# Patient Record
Sex: MALE | Race: WHITE | Employment: FULL TIME | URBAN - METROPOLITAN AREA
[De-identification: names, ages, dates, MRNs, and addresses within clinical notes are randomized per-mention and may not be internally consistent; named-entity substitution may affect disease eponyms.]

---

## 2017-01-12 ENCOUNTER — OFFICE VISIT (OUTPATIENT)
Dept: ORTHOPEDIC SURGERY | Age: 47
End: 2017-01-12

## 2017-01-12 VITALS
HEIGHT: 71 IN | DIASTOLIC BLOOD PRESSURE: 83 MMHG | WEIGHT: 227.96 LBS | SYSTOLIC BLOOD PRESSURE: 128 MMHG | BODY MASS INDEX: 31.91 KG/M2 | HEART RATE: 89 BPM

## 2017-01-12 DIAGNOSIS — M17.11 PRIMARY OSTEOARTHRITIS OF RIGHT KNEE: ICD-10-CM

## 2017-01-12 DIAGNOSIS — M25.561 RIGHT KNEE PAIN, UNSPECIFIED CHRONICITY: Primary | ICD-10-CM

## 2017-01-12 PROCEDURE — 99214 OFFICE O/P EST MOD 30 MIN: CPT | Performed by: ORTHOPAEDIC SURGERY

## 2017-01-12 PROCEDURE — 73562 X-RAY EXAM OF KNEE 3: CPT | Performed by: ORTHOPAEDIC SURGERY

## 2017-01-30 DIAGNOSIS — I10 ESSENTIAL HYPERTENSION: ICD-10-CM

## 2017-01-30 RX ORDER — LISINOPRIL 20 MG/1
20 TABLET ORAL DAILY
Qty: 90 TABLET | Refills: 1 | Status: SHIPPED | OUTPATIENT
Start: 2017-01-30 | End: 2017-04-26 | Stop reason: SDUPTHER

## 2017-02-20 ENCOUNTER — OFFICE VISIT (OUTPATIENT)
Dept: FAMILY MEDICINE CLINIC | Age: 47
End: 2017-02-20

## 2017-02-20 VITALS
HEIGHT: 71 IN | BODY MASS INDEX: 32.98 KG/M2 | SYSTOLIC BLOOD PRESSURE: 122 MMHG | DIASTOLIC BLOOD PRESSURE: 84 MMHG | HEART RATE: 82 BPM | OXYGEN SATURATION: 97 % | TEMPERATURE: 98.2 F | WEIGHT: 235.6 LBS

## 2017-02-20 DIAGNOSIS — E78.2 MIXED HYPERLIPIDEMIA: ICD-10-CM

## 2017-02-20 DIAGNOSIS — Z01.818 PRE-OPERATIVE GENERAL PHYSICAL EXAMINATION: Primary | ICD-10-CM

## 2017-02-20 DIAGNOSIS — R20.2 PARESTHESIA OF RIGHT ARM AND LEG: ICD-10-CM

## 2017-02-20 DIAGNOSIS — J30.1 NON-SEASONAL ALLERGIC RHINITIS DUE TO POLLEN: ICD-10-CM

## 2017-02-20 DIAGNOSIS — R74.8 ELEVATED LIVER ENZYMES: ICD-10-CM

## 2017-02-20 DIAGNOSIS — Z00.00 ROUTINE GENERAL MEDICAL EXAMINATION AT A HEALTH CARE FACILITY: ICD-10-CM

## 2017-02-20 DIAGNOSIS — I63.9 ARTERIAL ISCHEMIC STROKE (HCC): ICD-10-CM

## 2017-02-20 DIAGNOSIS — M17.11 PRIMARY OSTEOARTHRITIS OF RIGHT KNEE: ICD-10-CM

## 2017-02-20 PROCEDURE — 93000 ELECTROCARDIOGRAM COMPLETE: CPT | Performed by: FAMILY MEDICINE

## 2017-02-20 PROCEDURE — 99244 OFF/OP CNSLTJ NEW/EST MOD 40: CPT | Performed by: FAMILY MEDICINE

## 2017-02-21 DIAGNOSIS — E78.2 MIXED HYPERLIPIDEMIA: ICD-10-CM

## 2017-02-21 DIAGNOSIS — I63.9 ARTERIAL ISCHEMIC STROKE (HCC): ICD-10-CM

## 2017-02-21 LAB
A/G RATIO: 1.9 (ref 1.1–2.2)
ALBUMIN SERPL-MCNC: 4.1 G/DL (ref 3.4–5)
ALP BLD-CCNC: 48 U/L (ref 40–129)
ALT SERPL-CCNC: 139 U/L (ref 10–40)
ANION GAP SERPL CALCULATED.3IONS-SCNC: 16 MMOL/L (ref 3–16)
AST SERPL-CCNC: 48 U/L (ref 15–37)
BASOPHILS ABSOLUTE: 0.1 K/UL (ref 0–0.2)
BASOPHILS RELATIVE PERCENT: 1.3 %
BILIRUB SERPL-MCNC: 0.3 MG/DL (ref 0–1)
BUN BLDV-MCNC: 14 MG/DL (ref 7–20)
CALCIUM SERPL-MCNC: 8.8 MG/DL (ref 8.3–10.6)
CHLORIDE BLD-SCNC: 102 MMOL/L (ref 99–110)
CHOLESTEROL, TOTAL: 162 MG/DL (ref 0–199)
CO2: 25 MMOL/L (ref 21–32)
CREAT SERPL-MCNC: 1 MG/DL (ref 0.9–1.3)
EOSINOPHILS ABSOLUTE: 0.1 K/UL (ref 0–0.6)
EOSINOPHILS RELATIVE PERCENT: 2.3 %
GFR AFRICAN AMERICAN: >60
GFR NON-AFRICAN AMERICAN: >60
GLOBULIN: 2.2 G/DL
GLUCOSE BLD-MCNC: 105 MG/DL (ref 70–99)
HCT VFR BLD CALC: 43.4 % (ref 40.5–52.5)
HDLC SERPL-MCNC: 55 MG/DL (ref 40–60)
HEMOGLOBIN: 14.5 G/DL (ref 13.5–17.5)
LDL CHOLESTEROL CALCULATED: 67 MG/DL
LYMPHOCYTES ABSOLUTE: 2.3 K/UL (ref 1–5.1)
LYMPHOCYTES RELATIVE PERCENT: 41 %
MCH RBC QN AUTO: 30.4 PG (ref 26–34)
MCHC RBC AUTO-ENTMCNC: 33.3 G/DL (ref 31–36)
MCV RBC AUTO: 91.2 FL (ref 80–100)
MONOCYTES ABSOLUTE: 0.5 K/UL (ref 0–1.3)
MONOCYTES RELATIVE PERCENT: 8.1 %
NEUTROPHILS ABSOLUTE: 2.6 K/UL (ref 1.7–7.7)
NEUTROPHILS RELATIVE PERCENT: 47.3 %
PDW BLD-RTO: 13.5 % (ref 12.4–15.4)
PLATELET # BLD: 151 K/UL (ref 135–450)
PMV BLD AUTO: 9.3 FL (ref 5–10.5)
POTASSIUM SERPL-SCNC: 4.1 MMOL/L (ref 3.5–5.1)
RBC # BLD: 4.76 M/UL (ref 4.2–5.9)
SODIUM BLD-SCNC: 143 MMOL/L (ref 136–145)
TOTAL PROTEIN: 6.3 G/DL (ref 6.4–8.2)
TRIGL SERPL-MCNC: 198 MG/DL (ref 0–150)
TSH SERPL DL<=0.05 MIU/L-ACNC: 2.41 UIU/ML (ref 0.27–4.2)
VLDLC SERPL CALC-MCNC: 40 MG/DL
WBC # BLD: 5.6 K/UL (ref 4–11)

## 2017-04-26 DIAGNOSIS — I10 ESSENTIAL HYPERTENSION: ICD-10-CM

## 2017-04-26 RX ORDER — LISINOPRIL 20 MG/1
20 TABLET ORAL DAILY
Qty: 90 TABLET | Refills: 1 | Status: SHIPPED | OUTPATIENT
Start: 2017-04-26 | End: 2017-10-20 | Stop reason: SDUPTHER

## 2017-05-26 DIAGNOSIS — I10 ESSENTIAL HYPERTENSION: ICD-10-CM

## 2017-05-26 RX ORDER — HYDROCHLOROTHIAZIDE 25 MG/1
25 TABLET ORAL DAILY
Qty: 90 TABLET | Refills: 1 | Status: SHIPPED | OUTPATIENT
Start: 2017-05-26 | End: 2017-12-01 | Stop reason: SDUPTHER

## 2017-06-08 DIAGNOSIS — E78.2 MIXED HYPERLIPIDEMIA: ICD-10-CM

## 2017-06-08 RX ORDER — FENOFIBRATE 134 MG/1
134 CAPSULE ORAL
Qty: 90 CAPSULE | Refills: 0 | Status: SHIPPED | OUTPATIENT
Start: 2017-06-08 | End: 2017-06-12 | Stop reason: SDUPTHER

## 2017-06-12 DIAGNOSIS — E78.5 HYPERLIPIDEMIA: ICD-10-CM

## 2017-06-12 RX ORDER — FENOFIBRATE 134 MG/1
134 CAPSULE ORAL
Qty: 90 CAPSULE | Refills: 1 | Status: SHIPPED | OUTPATIENT
Start: 2017-06-12 | End: 2018-03-19 | Stop reason: SDUPTHER

## 2017-06-14 RX ORDER — ATORVASTATIN CALCIUM 40 MG/1
20 TABLET, FILM COATED ORAL DAILY
Qty: 45 TABLET | Refills: 1 | Status: SHIPPED | OUTPATIENT
Start: 2017-06-14 | End: 2017-12-26 | Stop reason: SDUPTHER

## 2017-09-25 ENCOUNTER — OFFICE VISIT (OUTPATIENT)
Dept: FAMILY MEDICINE CLINIC | Age: 47
End: 2017-09-25

## 2017-09-25 VITALS
DIASTOLIC BLOOD PRESSURE: 88 MMHG | SYSTOLIC BLOOD PRESSURE: 138 MMHG | BODY MASS INDEX: 33.74 KG/M2 | TEMPERATURE: 98.2 F | WEIGHT: 241 LBS

## 2017-09-25 DIAGNOSIS — R74.8 ELEVATED LIVER ENZYMES: ICD-10-CM

## 2017-09-25 DIAGNOSIS — L57.0 ACTINIC KERATOSES: ICD-10-CM

## 2017-09-25 DIAGNOSIS — E78.2 MIXED HYPERLIPIDEMIA: Primary | ICD-10-CM

## 2017-09-25 DIAGNOSIS — R03.0 ELEVATED BLOOD PRESSURE READING WITHOUT DIAGNOSIS OF HYPERTENSION: ICD-10-CM

## 2017-09-25 DIAGNOSIS — R20.8 BURNING SENSATION: ICD-10-CM

## 2017-09-25 DIAGNOSIS — D48.5 NEOPLASM OF UNCERTAIN BEHAVIOR OF SKIN: ICD-10-CM

## 2017-09-25 DIAGNOSIS — I63.40 CEREBROVASCULAR ACCIDENT (CVA) DUE TO EMBOLISM OF CEREBRAL ARTERY (HCC): ICD-10-CM

## 2017-09-25 DIAGNOSIS — R79.89 ELEVATED FERRITIN: ICD-10-CM

## 2017-09-25 DIAGNOSIS — R73.9 HYPERGLYCEMIA: ICD-10-CM

## 2017-09-25 PROCEDURE — 17003 DESTRUCT PREMALG LES 2-14: CPT | Performed by: FAMILY MEDICINE

## 2017-09-25 PROCEDURE — 99214 OFFICE O/P EST MOD 30 MIN: CPT | Performed by: FAMILY MEDICINE

## 2017-09-25 PROCEDURE — 17000 DESTRUCT PREMALG LESION: CPT | Performed by: FAMILY MEDICINE

## 2017-09-25 ASSESSMENT — PATIENT HEALTH QUESTIONNAIRE - PHQ9
SUM OF ALL RESPONSES TO PHQ QUESTIONS 1-9: 0
SUM OF ALL RESPONSES TO PHQ9 QUESTIONS 1 & 2: 0
2. FEELING DOWN, DEPRESSED OR HOPELESS: 0
1. LITTLE INTEREST OR PLEASURE IN DOING THINGS: 0

## 2017-09-25 ASSESSMENT — ENCOUNTER SYMPTOMS
EYES NEGATIVE: 1
GASTROINTESTINAL NEGATIVE: 1
RESPIRATORY NEGATIVE: 1

## 2017-09-26 DIAGNOSIS — R79.89 ELEVATED FERRITIN: ICD-10-CM

## 2017-09-26 DIAGNOSIS — R74.8 ELEVATED LIVER ENZYMES: ICD-10-CM

## 2017-09-26 DIAGNOSIS — E78.2 MIXED HYPERLIPIDEMIA: ICD-10-CM

## 2017-09-26 DIAGNOSIS — R73.9 HYPERGLYCEMIA: ICD-10-CM

## 2017-09-26 LAB
A/G RATIO: 1.8 (ref 1.1–2.2)
ALBUMIN SERPL-MCNC: 4.1 G/DL (ref 3.4–5)
ALP BLD-CCNC: 59 U/L (ref 40–129)
ALT SERPL-CCNC: 192 U/L (ref 10–40)
ANION GAP SERPL CALCULATED.3IONS-SCNC: 16 MMOL/L (ref 3–16)
AST SERPL-CCNC: 85 U/L (ref 15–37)
BILIRUB SERPL-MCNC: <0.2 MG/DL (ref 0–1)
BUN BLDV-MCNC: 12 MG/DL (ref 7–20)
CALCIUM SERPL-MCNC: 9 MG/DL (ref 8.3–10.6)
CHLORIDE BLD-SCNC: 100 MMOL/L (ref 99–110)
CHOLESTEROL, TOTAL: 181 MG/DL (ref 0–199)
CO2: 26 MMOL/L (ref 21–32)
CREAT SERPL-MCNC: 0.9 MG/DL (ref 0.9–1.3)
FERRITIN: 988 NG/ML (ref 30–400)
GFR AFRICAN AMERICAN: >60
GFR NON-AFRICAN AMERICAN: >60
GLOBULIN: 2.3 G/DL
GLUCOSE BLD-MCNC: 116 MG/DL (ref 70–99)
HDLC SERPL-MCNC: 55 MG/DL (ref 40–60)
IRON SATURATION: 28 % (ref 20–50)
IRON: 104 UG/DL (ref 59–158)
LDL CHOLESTEROL CALCULATED: 68 MG/DL
POTASSIUM SERPL-SCNC: 4.3 MMOL/L (ref 3.5–5.1)
SODIUM BLD-SCNC: 142 MMOL/L (ref 136–145)
TOTAL IRON BINDING CAPACITY: 366 UG/DL (ref 260–445)
TOTAL PROTEIN: 6.4 G/DL (ref 6.4–8.2)
TRIGL SERPL-MCNC: 291 MG/DL (ref 0–150)
VLDLC SERPL CALC-MCNC: 58 MG/DL

## 2017-09-27 LAB
ESTIMATED AVERAGE GLUCOSE: 125.5 MG/DL
HBA1C MFR BLD: 6 %

## 2017-09-28 ENCOUNTER — TELEPHONE (OUTPATIENT)
Dept: FAMILY MEDICINE CLINIC | Age: 47
End: 2017-09-28

## 2017-09-28 DIAGNOSIS — R79.89 ELEVATED FERRITIN: ICD-10-CM

## 2017-09-28 DIAGNOSIS — R74.8 ELEVATED LIVER ENZYMES: ICD-10-CM

## 2017-09-28 DIAGNOSIS — R74.8 ELEVATED LIVER ENZYMES: Primary | ICD-10-CM

## 2017-09-28 NOTE — TELEPHONE ENCOUNTER
Called the lab and they said yes they can add the order to his lab work. Just fax the orders to [address] [place] [address].      Order is printed and faxed to [address] [place] [address] [phone]

## 2017-10-03 LAB
C282Y HEMOCHROMATOSIS MUT: NEGATIVE
H63D HEMOCHROMATOSIS MUT: NEGATIVE
HEMOCHROMATOSIS GENE ANALYSIS: NORMAL
HFE PCR SPECIMEN: NORMAL
S65C HEMOCHROMATOSIS MUT: NEGATIVE

## 2017-10-05 ENCOUNTER — PATIENT MESSAGE (OUTPATIENT)
Dept: FAMILY MEDICINE CLINIC | Age: 47
End: 2017-10-05

## 2017-10-05 DIAGNOSIS — R74.8 ELEVATED LIVER ENZYMES: ICD-10-CM

## 2017-10-05 DIAGNOSIS — R79.89 ELEVATED FERRITIN: Primary | ICD-10-CM

## 2017-10-20 DIAGNOSIS — I10 ESSENTIAL HYPERTENSION: ICD-10-CM

## 2017-10-20 RX ORDER — LISINOPRIL 20 MG/1
20 TABLET ORAL DAILY
Qty: 90 TABLET | Refills: 1 | Status: SHIPPED | OUTPATIENT
Start: 2017-10-20 | End: 2018-04-28 | Stop reason: SDUPTHER

## 2017-12-01 DIAGNOSIS — I10 ESSENTIAL HYPERTENSION: ICD-10-CM

## 2017-12-01 RX ORDER — HYDROCHLOROTHIAZIDE 25 MG/1
25 TABLET ORAL DAILY
Qty: 90 TABLET | Refills: 1 | Status: SHIPPED | OUTPATIENT
Start: 2017-12-01 | End: 2018-04-30 | Stop reason: SDUPTHER

## 2017-12-26 RX ORDER — ATORVASTATIN CALCIUM 40 MG/1
TABLET, FILM COATED ORAL
Qty: 45 TABLET | Refills: 0 | Status: SHIPPED | OUTPATIENT
Start: 2017-12-26 | End: 2018-03-30 | Stop reason: SDUPTHER

## 2018-01-16 ENCOUNTER — TELEPHONE (OUTPATIENT)
Dept: SURGERY | Age: 48
End: 2018-01-16

## 2018-01-16 ENCOUNTER — TELEPHONE (OUTPATIENT)
Dept: PAIN MANAGEMENT | Age: 48
End: 2018-01-16

## 2018-01-16 ENCOUNTER — OFFICE VISIT (OUTPATIENT)
Dept: FAMILY MEDICINE CLINIC | Age: 48
End: 2018-01-16

## 2018-01-16 VITALS
SYSTOLIC BLOOD PRESSURE: 120 MMHG | WEIGHT: 243.8 LBS | HEIGHT: 71 IN | TEMPERATURE: 98.5 F | OXYGEN SATURATION: 97 % | HEART RATE: 87 BPM | BODY MASS INDEX: 34.13 KG/M2 | DIASTOLIC BLOOD PRESSURE: 88 MMHG

## 2018-01-16 DIAGNOSIS — Z12.11 ENCOUNTER FOR SCREENING COLONOSCOPY: ICD-10-CM

## 2018-01-16 DIAGNOSIS — M47.812 OSTEOARTHRITIS OF CERVICAL SPINE, UNSPECIFIED SPINAL OSTEOARTHRITIS COMPLICATION STATUS: ICD-10-CM

## 2018-01-16 DIAGNOSIS — Z00.00 ROUTINE GENERAL MEDICAL EXAMINATION AT A HEALTH CARE FACILITY: Primary | ICD-10-CM

## 2018-01-16 DIAGNOSIS — G47.33 OBSTRUCTIVE SLEEP APNEA: ICD-10-CM

## 2018-01-16 DIAGNOSIS — R74.8 ELEVATED LIVER ENZYMES: ICD-10-CM

## 2018-01-16 DIAGNOSIS — Z11.4 SCREENING FOR HIV (HUMAN IMMUNODEFICIENCY VIRUS): ICD-10-CM

## 2018-01-16 DIAGNOSIS — Z12.5 SCREENING PSA (PROSTATE SPECIFIC ANTIGEN): ICD-10-CM

## 2018-01-16 DIAGNOSIS — G89.4 CHRONIC PAIN SYNDROME: ICD-10-CM

## 2018-01-16 DIAGNOSIS — N52.9 ERECTILE DYSFUNCTION, UNSPECIFIED ERECTILE DYSFUNCTION TYPE: ICD-10-CM

## 2018-01-16 DIAGNOSIS — M17.0 PRIMARY OSTEOARTHRITIS OF BOTH KNEES: ICD-10-CM

## 2018-01-16 DIAGNOSIS — E78.2 MIXED HYPERLIPIDEMIA: ICD-10-CM

## 2018-01-16 DIAGNOSIS — R79.89 ELEVATED FERRITIN: ICD-10-CM

## 2018-01-16 DIAGNOSIS — J30.89 CHRONIC NONSEASONAL ALLERGIC RHINITIS DUE TO POLLEN: ICD-10-CM

## 2018-01-16 LAB
BILIRUBIN, POC: NORMAL
BLOOD URINE, POC: NORMAL
CLARITY, POC: CLEAR
COLOR, POC: YELLOW
GLUCOSE URINE, POC: NORMAL
KETONES, POC: NORMAL
LEUKOCYTE EST, POC: NORMAL
NITRITE, POC: NORMAL
PH, POC: 6.5
PROTEIN, POC: NORMAL
SPECIFIC GRAVITY, POC: 1.01
UROBILINOGEN, POC: 0.2

## 2018-01-16 PROCEDURE — 99396 PREV VISIT EST AGE 40-64: CPT | Performed by: FAMILY MEDICINE

## 2018-01-16 PROCEDURE — 81002 URINALYSIS NONAUTO W/O SCOPE: CPT | Performed by: FAMILY MEDICINE

## 2018-01-16 ASSESSMENT — ENCOUNTER SYMPTOMS
BACK PAIN: 1
ALLERGIC/IMMUNOLOGIC NEGATIVE: 1
GASTROINTESTINAL NEGATIVE: 1
EYES NEGATIVE: 1
RESPIRATORY NEGATIVE: 1

## 2018-01-17 ENCOUNTER — OFFICE VISIT (OUTPATIENT)
Dept: PAIN MANAGEMENT | Age: 48
End: 2018-01-17

## 2018-01-17 VITALS
BODY MASS INDEX: 34.58 KG/M2 | HEIGHT: 71 IN | SYSTOLIC BLOOD PRESSURE: 124 MMHG | HEART RATE: 73 BPM | DIASTOLIC BLOOD PRESSURE: 75 MMHG | WEIGHT: 247 LBS

## 2018-01-17 DIAGNOSIS — Z12.5 SCREENING PSA (PROSTATE SPECIFIC ANTIGEN): ICD-10-CM

## 2018-01-17 DIAGNOSIS — Z11.4 SCREENING FOR HIV (HUMAN IMMUNODEFICIENCY VIRUS): ICD-10-CM

## 2018-01-17 DIAGNOSIS — R79.89 ELEVATED FERRITIN: ICD-10-CM

## 2018-01-17 DIAGNOSIS — Z00.00 ROUTINE GENERAL MEDICAL EXAMINATION AT A HEALTH CARE FACILITY: ICD-10-CM

## 2018-01-17 DIAGNOSIS — M47.896 OTHER SPONDYLOSIS, LUMBAR REGION: ICD-10-CM

## 2018-01-17 DIAGNOSIS — M51.26 HERNIATED LUMBAR INTERVERTEBRAL DISC: Primary | ICD-10-CM

## 2018-01-17 DIAGNOSIS — G89.29 OTHER CHRONIC PAIN: ICD-10-CM

## 2018-01-17 LAB
A/G RATIO: 2.1 (ref 1.1–2.2)
ALBUMIN SERPL-MCNC: 4.4 G/DL (ref 3.4–5)
ALP BLD-CCNC: 55 U/L (ref 40–129)
ALT SERPL-CCNC: 186 U/L (ref 10–40)
ANION GAP SERPL CALCULATED.3IONS-SCNC: 13 MMOL/L (ref 3–16)
AST SERPL-CCNC: 93 U/L (ref 15–37)
BASOPHILS ABSOLUTE: 0.1 K/UL (ref 0–0.2)
BASOPHILS RELATIVE PERCENT: 1.1 %
BILIRUB SERPL-MCNC: 0.4 MG/DL (ref 0–1)
BUN BLDV-MCNC: 11 MG/DL (ref 7–20)
CALCIUM SERPL-MCNC: 9.3 MG/DL (ref 8.3–10.6)
CHLORIDE BLD-SCNC: 103 MMOL/L (ref 99–110)
CHOLESTEROL, TOTAL: 168 MG/DL (ref 0–199)
CO2: 25 MMOL/L (ref 21–32)
CREAT SERPL-MCNC: 1 MG/DL (ref 0.9–1.3)
EOSINOPHILS ABSOLUTE: 0.1 K/UL (ref 0–0.6)
EOSINOPHILS RELATIVE PERCENT: 1.9 %
FERRITIN: 848.1 NG/ML (ref 30–400)
GFR AFRICAN AMERICAN: >60
GFR NON-AFRICAN AMERICAN: >60
GLOBULIN: 2.1 G/DL
GLUCOSE BLD-MCNC: 130 MG/DL (ref 70–99)
HCT VFR BLD CALC: 44 % (ref 40.5–52.5)
HDLC SERPL-MCNC: 59 MG/DL (ref 40–60)
HEMOGLOBIN: 14.9 G/DL (ref 13.5–17.5)
LDL CHOLESTEROL CALCULATED: 81 MG/DL
LYMPHOCYTES ABSOLUTE: 2 K/UL (ref 1–5.1)
LYMPHOCYTES RELATIVE PERCENT: 39.2 %
MCH RBC QN AUTO: 31.3 PG (ref 26–34)
MCHC RBC AUTO-ENTMCNC: 33.8 G/DL (ref 31–36)
MCV RBC AUTO: 92.7 FL (ref 80–100)
MONOCYTES ABSOLUTE: 0.6 K/UL (ref 0–1.3)
MONOCYTES RELATIVE PERCENT: 10.9 %
NEUTROPHILS ABSOLUTE: 2.4 K/UL (ref 1.7–7.7)
NEUTROPHILS RELATIVE PERCENT: 46.9 %
PDW BLD-RTO: 13.3 % (ref 12.4–15.4)
PLATELET # BLD: 155 K/UL (ref 135–450)
PMV BLD AUTO: 9.6 FL (ref 5–10.5)
POTASSIUM SERPL-SCNC: 3.9 MMOL/L (ref 3.5–5.1)
PROSTATE SPECIFIC ANTIGEN: 0.55 NG/ML (ref 0–4)
RBC # BLD: 4.75 M/UL (ref 4.2–5.9)
SODIUM BLD-SCNC: 141 MMOL/L (ref 136–145)
TOTAL PROTEIN: 6.5 G/DL (ref 6.4–8.2)
TRIGL SERPL-MCNC: 142 MG/DL (ref 0–150)
TSH SERPL DL<=0.05 MIU/L-ACNC: 2.7 UIU/ML (ref 0.27–4.2)
VITAMIN D 25-HYDROXY: 46.3 NG/ML
VLDLC SERPL CALC-MCNC: 28 MG/DL
WBC # BLD: 5.1 K/UL (ref 4–11)

## 2018-01-17 PROCEDURE — 99203 OFFICE O/P NEW LOW 30 MIN: CPT | Performed by: ANESTHESIOLOGY

## 2018-01-17 RX ORDER — GABAPENTIN 300 MG/1
CAPSULE ORAL
Qty: 90 CAPSULE | Refills: 1 | Status: ON HOLD | OUTPATIENT
Start: 2018-01-17 | End: 2018-05-31 | Stop reason: CLARIF

## 2018-01-17 ASSESSMENT — ENCOUNTER SYMPTOMS
EYE PAIN: 0
ABDOMINAL PAIN: 0
ORTHOPNEA: 0
HEMOPTYSIS: 0
EYE DISCHARGE: 0
COUGH: 0
EYE REDNESS: 0
HEARTBURN: 0
NAUSEA: 0
VOMITING: 0
SHORTNESS OF BREATH: 0
BACK PAIN: 0
CONSTIPATION: 0
WHEEZING: 0

## 2018-01-17 ASSESSMENT — PATIENT HEALTH QUESTIONNAIRE - PHQ9
7. TROUBLE CONCENTRATING ON THINGS, SUCH AS READING THE NEWSPAPER OR WATCHING TELEVISION: 0
6. FEELING BAD ABOUT YOURSELF - OR THAT YOU ARE A FAILURE OR HAVE LET YOURSELF OR YOUR FAMILY DOWN: 0
10. IF YOU CHECKED OFF ANY PROBLEMS, HOW DIFFICULT HAVE THESE PROBLEMS MADE IT FOR YOU TO DO YOUR WORK, TAKE CARE OF THINGS AT HOME, OR GET ALONG WITH OTHER PEOPLE: 1
2. FEELING DOWN, DEPRESSED OR HOPELESS: 0
SUM OF ALL RESPONSES TO PHQ9 QUESTIONS 1 & 2: 3
SUM OF ALL RESPONSES TO PHQ QUESTIONS 1-9: 9
1. LITTLE INTEREST OR PLEASURE IN DOING THINGS: 3
4. FEELING TIRED OR HAVING LITTLE ENERGY: 2
8. MOVING OR SPEAKING SO SLOWLY THAT OTHER PEOPLE COULD HAVE NOTICED. OR THE OPPOSITE, BEING SO FIGETY OR RESTLESS THAT YOU HAVE BEEN MOVING AROUND A LOT MORE THAN USUAL: 1
5. POOR APPETITE OR OVEREATING: 0
3. TROUBLE FALLING OR STAYING ASLEEP: 3
9. THOUGHTS THAT YOU WOULD BE BETTER OFF DEAD, OR OF HURTING YOURSELF: 0

## 2018-01-17 NOTE — PROGRESS NOTES
Elevated liver enzymes     Hyperlipidemia     Osteoarthritis of cervical spine 1/20/2016    Plantar fasciitis of right foot 10/19/2015    Primary osteoarthritis of both knees 1/20/2016    Sprain and strain of medial collateral ligament of knee 12/12/2013       Past Surgical History:   Procedure Laterality Date    CERVICAL FUSION      KNEE ARTHROSCOPY Right [id number]       No Known Allergies    Current Outpatient Prescriptions   Medication Sig Dispense Refill    gabapentin (NEURONTIN) 300 MG capsule May take 1 to 2 capsules at night for 1 week,  Then take 1 cap in the morning and 2 cap at night, as tolerated. 90 capsule 1    Milk Thistle 250 MG CAPS Take by mouth 2 times daily      atorvastatin (LIPITOR) 40 MG tablet TAKE ONE-HALF TABLET BY MOUTH DAILY 45 tablet 0    hydrochlorothiazide (HYDRODIURIL) 25 MG tablet Take 1 tablet by mouth daily 90 tablet 1    lisinopril (PRINIVIL;ZESTRIL) 20 MG tablet Take 1 tablet by mouth daily 90 tablet 1    fenofibrate micronized (LOFIBRA) 134 MG capsule Take 1 capsule by mouth every morning (before breakfast) 90 capsule 1    Flaxseed, Linseed, (FLAXSEED OIL) 1000 MG CAPS Take 1,000 mg by mouth 2 times daily      aspirin 81 MG tablet Take 81 mg by mouth daily      Omega-3 Fatty Acids (FISH OIL) 1000 MG CAPS Take 1,000 mg by mouth 2 times daily        No current facility-administered medications for this visit. Family History   Problem Relation Age of Onset    Cancer Maternal Grandmother      pancrease    Substance Abuse Father        Social History     Social History    Marital status:      Spouse name: N/A    Number of children: N/A    Years of education: N/A     Occupational History    Not on file.      Social History Main Topics    Smoking status: Former Smoker     Packs/day: 0.01     Types: Cigarettes     Start date: 12/19/2012    Smokeless tobacco: Former User     Quit date: 8/29/2012    Alcohol use 8.4 - 12.6 oz/week     14 - 21 Glasses of hemoptysis, shortness of breath and wheezing. Cardiovascular: Negative for chest pain, palpitations and orthopnea. Gastrointestinal: Negative for abdominal pain, constipation, heartburn, nausea and vomiting. Genitourinary: Negative for frequency, hematuria and urgency. Musculoskeletal: Negative for back pain, falls, joint pain, myalgias and neck pain. Skin: Negative for itching and rash. Neurological: Negative for dizziness, tingling, sensory change, focal weakness, seizures, weakness and headaches. Endo/Heme/Allergies: Does not bruise/bleed easily. Psychiatric/Behavioral: Negative for depression, substance abuse and suicidal ideas. The patient is not nervous/anxious and does not have insomnia. The patient was instructed to contact primary care physician regarding ROS positives not being addressed during today's visit. 2. Physical Exam:   Physical Exam   Constitutional: He is oriented to person, place, and time. No distress. HENT:   Head: Normocephalic. Eyes: EOM are normal. Pupils are equal, round, and reactive to light. Neck: Normal range of motion. Neck supple. No thyromegaly present. Cardiovascular: Normal rate, regular rhythm, normal heart sounds and intact distal pulses. Pulmonary/Chest: Effort normal and breath sounds normal. No respiratory distress. He exhibits no tenderness. Abdominal: Soft. Bowel sounds are normal. He exhibits no mass. There is no tenderness. There is no guarding. Musculoskeletal: Normal range of motion. He exhibits no tenderness or deformity. Lymphadenopathy:     He has no cervical adenopathy. Neurological: He is alert and oriented to person, place, and time. He has normal strength and normal reflexes. No cranial nerve deficit or sensory deficit. He exhibits normal muscle tone. Coordination and gait normal.   Reflex Scores:       Tricep reflexes are 2+ on the right side and 2+ on the left side.        Bicep reflexes are 2+ on the right side

## 2018-01-17 NOTE — PATIENT INSTRUCTIONS
heating pad on your skin. ¨ To use ice, put ice or a cold pack on the area for 10 to 20 minutes at a time. Put a thin cloth between the ice and your skin. · Your doctor may recommend a physical therapy program, where you learn exercises to do at home. These exercises strengthen the muscles that support your lower back and prevent reinjury. · Stay at a healthy weight. This may reduce the load on your back. · Quit smoking if you smoke. If you need help quitting, talk to your doctor about stop-smoking programs and medicines. These can increase your chances of quitting for good. · To avoid hurting your back when lifting:  ¨ Lift with your legs, not your back, by squatting and bending your knees. Avoid bending forward at the waist when lifting. ¨ Rise slowly. ¨ Keep the load as close to your body as possible, at the level of your navel. ¨ Avoid turning or twisting your body while holding a heavy object. ¨ Get help if you need to lift a heavy object. Never lift a heavy object above shoulder level. When should you call for help? Call 911 anytime you think you may need emergency care. For example, call if:  ? · You are unable to move a leg at all. ?Call your doctor now or seek immediate medical care if:  ? · You have new or worse symptoms in your arms, legs, chest, belly, or buttocks. Symptoms may include:  ¨ Numbness or tingling. ¨ Weakness. ¨ Pain. ? · You lose bladder or bowel control. ? Watch closely for changes in your health, and be sure to contact your doctor if:  ? · You are not getting better as expected. Where can you learn more? Go to https://VLN Partnersdenita.[email or web address]. org and sign in to your [employer] account. Enter F534 in the [employer] box to learn more about \"Herniated Disc: Care Instructions. \"     If you do not have an account, please click on the \"Sign Up Now\" link. Current as of: March 21, 2017  Content Version: 11.5  © 4896-9501 [employer].  Care instructions adapted under license by [facility] ([facility]). If you have questions about a medical condition or this instruction, always ask your healthcare professional. [address] any warranty or liability for your use of this information. Patient Education        Low Back Pain: Exercises  Your Care Instructions  Here are some examples of typical rehabilitation exercises for your condition. Start each exercise slowly. Ease off the exercise if you start to have pain. Your doctor or physical therapist will tell you when you can start these exercises and which ones will work best for you. How to do the exercises  Press-up    1. Lie on your stomach, supporting your body with your forearms. 2. Press your elbows down into the floor to raise your upper back. As you do this, relax your stomach muscles and allow your back to arch without using your back muscles. As your press up, do not let your hips or pelvis come off the floor. 3. Hold for 15 to 30 seconds, then relax. 4. Repeat 2 to 4 times. Alternate arm and leg (bird dog) exercise    Do this exercise slowly. Try to keep your body straight at all times, and do not let one hip drop lower than the other. 1. Start on the floor, on your hands and knees. 2. Tighten your belly muscles. 3. Raise one leg off the floor, and hold it straight out behind you. Be careful not to let your hip drop down, because that will twist your trunk. 4. Hold for about 6 seconds, then lower your leg and switch to the other leg. 5. Repeat 8 to 12 times on each leg. 6. Over time, work up to holding for 10 to 30 seconds each time. 7. If you feel stable and secure with your leg raised, try raising the opposite arm straight out in front of you at the same time. Knee-to-chest exercise    1. Lie on your back with your knees bent and your feet flat on the floor.   2. Bring one knee to your chest, keeping the other foot flat on the floor (or keeping the other leg straight, whichever feels better on your lower back). 3. Keep your lower back pressed to the floor. Hold for at least 15 to 30 seconds. 4. Relax, and lower the knee to the starting position. 5. Repeat with the other leg. Repeat 2 to 4 times with each leg. 6. To get more stretch, put your other leg flat on the floor while pulling your knee to your chest.  Curl-ups    1. Lie on the floor on your back with your knees bent at a 90-degree angle. Your feet should be flat on the floor, about 12 inches from your buttocks. 2. Cross your arms over your chest. If this bothers your neck, try putting your hands behind your neck (not your head), with your elbows spread apart. 3. Slowly tighten your belly muscles and raise your shoulder blades off the floor. 4. Keep your head in line with your body, and do not press your chin to your chest.  5. Hold this position for 1 or 2 seconds, then slowly lower yourself back down to the floor. 6. Repeat 8 to 12 times. Pelvic tilt exercise    1. Lie on your back with your knees bent. 2. \"Brace\" your stomach. This means to tighten your muscles by pulling in and imagining your belly button moving toward your spine. You should feel like your back is pressing to the floor and your hips and pelvis are rocking back. 3. Hold for about 6 seconds while you breathe smoothly. 4. Repeat 8 to 12 times. Heel dig bridging    1. Lie on your back with both knees bent and your ankles bent so that only your heels are digging into the floor. Your knees should be bent about 90 degrees. 2. Then push your heels into the floor, squeeze your buttocks, and lift your hips off the floor until your shoulders, hips, and knees are all in a straight line. 3. Hold for about 6 seconds as you continue to breathe normally, and then slowly lower your hips back down to the floor and rest for up to 10 seconds. 4. Do 8 to 12 repetitions. Hamstring stretch in doorway    1.  Lie on your back in a doorway, with one leg through the open door. 2. Slide your leg up the wall to straighten your knee. You should feel a gentle stretch down the back of your leg. 3. Hold the stretch for at least 15 to 30 seconds. Do not arch your back, point your toes, or bend either knee. Keep one heel touching the floor and the other heel touching the wall. 4. Repeat with your other leg. 5. Do 2 to 4 times for each leg. Hip flexor stretch    1. Kneel on the floor with one knee bent and one leg behind you. Place your forward knee over your foot. Keep your other knee touching the floor. 2. Slowly push your hips forward until you feel a stretch in the upper thigh of your rear leg. 3. Hold the stretch for at least 15 to 30 seconds. Repeat with your other leg. 4. Do 2 to 4 times on each side. Wall sit    1. Stand with your back 10 to 12 inches away from a wall. 2. Lean into the wall until your back is flat against it. 3. Slowly slide down until your knees are slightly bent, pressing your lower back into the wall. 4. Hold for about 6 seconds, then slide back up the wall. 5. Repeat 8 to 12 times. Follow-up care is a key part of your treatment and safety. Be sure to make and go to all appointments, and call your doctor if you are having problems. It's also a good idea to know your test results and keep a list of the medicines you take. Where can you learn more? Go to https://SEJENTpePredictAd.Argus. org and sign in to your [employer] account. Enter O964 in the [facility] box to learn more about \"Low Back Pain: Exercises. \"     If you do not have an account, please click on the \"Sign Up Now\" link. Current as of: March 21, 2017  Content Version: 11.5  © 0153-0254 [employer]. Care instructions adapted under license by [address]  If you have questions about a medical condition or this instruction, always ask your healthcare professional. [address]  any warranty or liability for your use of this

## 2018-01-17 NOTE — PROGRESS NOTES
1/16/18    Corine Sanchez  1970      Chief Complaint   Patient presents with    Annual Exam     Well Adult Physical: Patient here for a comprehensive physical exam.The patient reports no problems  Do you take any herbs or supplements that were not prescribed by a doctor? yes Are you taking calcium supplements? no Are you taking aspirin daily?  yes   History:        Vitals:    01/16/18 1027   BP: 120/88   Pulse: 87   Temp: 98.5 °F (36.9 °C)   SpO2: 97%         Immunization History   Administered Date(s) Administered    Influenza Virus Vaccine 10/09/2010, 10/04/2017    Influenza Whole 09/12/2013    Influenza, Jennifer Hajilicoanastasia, 3 yrs and older, IM, Preservative Free 09/26/2016    MMR 01/23/2015    Tdap (Boostrix, Adacel) 01/21/2011       No Known Allergies  Outpatient Prescriptions Marked as Taking for the 1/16/18 encounter (Office Visit) with Mana Herrera MD   Medication Sig Dispense Refill    Milk Thistle 250 MG CAPS Take by mouth 2 times daily      atorvastatin (LIPITOR) 40 MG tablet TAKE ONE-HALF TABLET BY MOUTH DAILY 45 tablet 0    hydrochlorothiazide (HYDRODIURIL) 25 MG tablet Take 1 tablet by mouth daily 90 tablet 1    lisinopril (PRINIVIL;ZESTRIL) 20 MG tablet Take 1 tablet by mouth daily 90 tablet 1    fenofibrate micronized (LOFIBRA) 134 MG capsule Take 1 capsule by mouth every morning (before breakfast) 90 capsule 1    Flaxseed, Linseed, (FLAXSEED OIL) 1000 MG CAPS Take 1,000 mg by mouth 2 times daily      aspirin 81 MG tablet Take 81 mg by mouth daily      Omega-3 Fatty Acids (FISH OIL) 1000 MG CAPS Take 1,000 mg by mouth 2 times daily          Past Medical History:   Diagnosis Date    Allergic rhinitis     Allergic rhinitis due to pollen 1/20/2016    Cerebrovascular accident ([facility] [address] 7/16/2015    Cervical herniated disc     Cervical radiculopathy 5/23/2014    Chicken pox     Chondromalacia of patella 12/12/2013     replace inactive diagnosis    Degenerative joint disease of knee and no deformity. Right knee: He exhibits decreased range of motion, swelling, deformity and abnormal patellar mobility. He exhibits no effusion, no ecchymosis and no bony tenderness. Tenderness found. Left knee: He exhibits decreased range of motion, swelling, effusion and deformity. He exhibits no ecchymosis, normal alignment, normal patellar mobility and no bony tenderness. Tenderness found. Right ankle: Normal. He exhibits normal range of motion, no swelling, no ecchymosis, no deformity and normal pulse. Achilles tendon normal. Achilles tendon exhibits no pain and no defect. Left ankle: Normal. He exhibits normal range of motion, no swelling, no ecchymosis, no deformity and normal pulse. No tenderness. Achilles tendon normal. Achilles tendon exhibits no pain and no defect. Cervical back: Normal. He exhibits normal range of motion, no tenderness, no bony tenderness, no pain and no spasm. Thoracic back: Normal. He exhibits normal range of motion, no tenderness, no bony tenderness, no deformity, no pain and no spasm. Right hand: Normal. He exhibits normal range of motion, no tenderness, normal capillary refill and no deformity. Normal sensation noted. Normal strength noted. Left hand: Normal. He exhibits normal range of motion, no tenderness, no bony tenderness, normal capillary refill, no deformity and no swelling. Normal sensation noted. Normal strength noted. Right foot: Normal. There is normal range of motion, no tenderness, no bony tenderness, no swelling and no deformity. Left foot: Normal. There is normal range of motion, no tenderness, no bony tenderness, no swelling and no deformity. Lymphadenopathy:        Head (right side): No submental and no submandibular adenopathy present. Head (left side): No submental and no submandibular adenopathy present. He has no cervical adenopathy. He has no axillary adenopathy. Right: No inguinal and no supraclavicular adenopathy present. Left: No inguinal and no supraclavicular adenopathy present. Neurological: He is alert and oriented to person, place, and time. He has normal strength and normal reflexes. He displays no atrophy and no tremor. No cranial nerve deficit or sensory deficit. He exhibits normal muscle tone. He displays a negative Romberg sign. Coordination and gait normal.   Skin: Skin is warm and dry. No bruising, no ecchymosis and no rash noted. He is not diaphoretic. No cyanosis or erythema. No pallor. Nails show no clubbing. Psychiatric: He has a normal mood and affect. His behavior is normal. Judgment and thought content normal.         1. Routine general medical examination at a health care facility  Age appropriate teaching done. Continue all treatments. Immunizations and health maintenance as needed   POCT Urinalysis no Micro    CBC Auto Differential    Comprehensive Metabolic Panel    Lipid Panel    TSH without Reflex    Vitamin D 25 Hydroxy    Ferritin   2. Elevated ferritin   Condition stable continue the medications, treatments, will check labs as appropriate    Ferritin   3. Mixed hyperlipidemia   Condition stable continue the medications, treatments, will check labs as appropriate       The patient received counseling on the following healthy behaviors: nutrition, exercise, medication adherence and decrease in alcohol consumption    Patient given educational materials on Hyperlipidemia and Nutrition if appropriate    I have instructed the patient to complete a self tracking handout on diet and instructed them to bring it with them to the  next appointment. Discussed use, benefit, and side effects of prescribed medications. Barriers to medication compliance addressed. All patient questions answered. Pt voiced understanding.            4. Chronic nonseasonal allergic rhinitis due to pollen  Condition stable continue the medications, treatments,

## 2018-01-18 LAB — HIV-1 AND HIV-2 ANTIBODIES: NORMAL

## 2018-01-19 ENCOUNTER — TELEPHONE (OUTPATIENT)
Dept: FAMILY MEDICINE CLINIC | Age: 48
End: 2018-01-19

## 2018-01-19 DIAGNOSIS — R73.9 HYPERGLYCEMIA: ICD-10-CM

## 2018-01-19 DIAGNOSIS — R73.9 HYPERGLYCEMIA: Primary | ICD-10-CM

## 2018-01-20 LAB
ESTIMATED AVERAGE GLUCOSE: 125.5 MG/DL
HBA1C MFR BLD: 6 %

## 2018-03-08 ENCOUNTER — TELEPHONE (OUTPATIENT)
Dept: SURGERY | Age: 48
End: 2018-03-08

## 2018-03-08 NOTE — LETTER
of your procedure. Please make sure you have a working telephone, mailing address, and email address in our computer system, as the office will communicate with you as a courtesy 7 and 2 days before your procedure, as well as 3-5 days afterward with any biopsy results. If you need to cancel or reschedule, please do so at 7 days before the procedure, so that we can be considerate to other patients who are waiting to be scheduled. If you cancel or reschedule less than 7 days before your procedure, you will be placed on a lower priority list and may be charged a cancellation fee. How Do You Prepare for the Procedure? · Understand exactly what procedure is planned, along with the risks  · Tell your doctors ALL the medicines, vitamins, supplements, and herbal remedies you take. Some of these can increase the risk of bleeding or interact with anesthesia. · If you take blood thinners (Coumadin, aspirin, Plavix, Pletal, Aggrenox, Xarelto, Pradaxa, Eliquis), be sure to talk to your doctor. He or she will tell you if and when you should stop taking these medicines before your procedure. · If you are diabetic please talk to your Doctor about your Insulin instructions. PLEASE READ AND COMPLETELY FOLLOW THE PREP INFORMATION BELOW                          1 Day Before your Colonoscopy    · ON THE DAY PRIOR TO YOUR PROCEDURE, DO NOT EAT ANY SOLID FOOD THE ENTIRE DAY. · ON THE DAY PRIOR TO YOUR PROCEDURE, DRINK ONLY CLEAR LIQUIDS:  · You can have water, tea, coffee (without cream), clear juices without pulp, soda, clear broths, hard candy, flavored ice pops, Clear Ensure/Boost drinks, and gelatin (such as Jell-O). Do not drink anything red or purple, such as grape juice or fruit punch. · Drink at least 8 oz of clear liquids every hour throughout the day to keep hydrated. · In the morning- mix the Golytely solution (this was sent to your pharmacy) and place in the refrigerator.  Do not eat any solid food the entire day. · At 4 pm - Drink an 8 oz glass of GoLytely every 10-20 min until ½ of the bottle is empty. If you become nauseated, take a rest and then be sure to finish half of the bottle. The Day of your Colonoscopy    · 6 hours before your procedure- Drink an 8oz glass of GoLytely every 10-20 min until the bottle is completely gone. · You may continue to have clear liquids up to 3 hours before the test.  · The morning of the procedure - You may take any heart or blood pressure medications. · You must have someone WITH YOU to drive you home after the procedure. · PLEASE ARRIVE AT [place] [id number] 1.5 HOURS BEFORE THE START OF THE PROCEDURE  · Please bring a list of medications, any inhalers, CPAP machines, photo ID and insurance cards with you. If you have a pacemaker or defibrillator, please inform your nurse. · Please call [phone] with questions    For many people, the prep is worse than the procedure itself. It may be uncomfortable, and you may feel hungry on the clear liquid diet. Some people do not go to work or do not do their usual activities on the day of the prep. If you eat solid food or do not complete the prep, your colonoscopy may be cancelled or you may be asked to do the procedure again. What can you expect after a colonoscopy? The nurses will watch you in the recovery area for 1 to 2 hours until the medicines wear off. Then you can go home. You will need a ride. You can resume a normal diet after the procedure. You are legally not allowed to drive or operate machinery after the procedure, as you will have received sedation. Do not plan on going back to work that day. Your doctor will talk to you about when you will need your next colonoscopy. This will depend on the results of the colonoscopy and your medical and family history. Complications:    Colonoscopy is generally a very safe procedure with low complication risk.

## 2018-03-12 NOTE — TELEPHONE ENCOUNTER
Spoke with patient. Colonoscopy scheduled.   Dr. Laura Silver  Location: [facility]-order sent   Date: 5/31/18  Time: 1000  Arrival time: 0830  Blood thinners: ASA/FISH OIL  Pharmacy: [facility]   Instruction preference: EMAIL/Needbox AS- VERIFIED-sent

## 2018-03-19 DIAGNOSIS — E78.5 HYPERLIPIDEMIA: ICD-10-CM

## 2018-03-19 RX ORDER — FENOFIBRATE 134 MG/1
134 CAPSULE ORAL
Qty: 90 CAPSULE | Refills: 1 | Status: SHIPPED | OUTPATIENT
Start: 2018-03-19 | End: 2018-04-30 | Stop reason: SDUPTHER

## 2018-03-30 DIAGNOSIS — E78.2 MIXED HYPERLIPIDEMIA: Primary | ICD-10-CM

## 2018-03-30 RX ORDER — ATORVASTATIN CALCIUM 40 MG/1
20 TABLET, FILM COATED ORAL DAILY
Qty: 45 TABLET | Refills: 1 | Status: SHIPPED | OUTPATIENT
Start: 2018-03-30 | End: 2018-05-04 | Stop reason: SDUPTHER

## 2018-04-28 DIAGNOSIS — I10 ESSENTIAL HYPERTENSION: ICD-10-CM

## 2018-04-28 RX ORDER — LISINOPRIL 20 MG/1
TABLET ORAL
Qty: 90 TABLET | Refills: 0 | Status: SHIPPED | OUTPATIENT
Start: 2018-04-28 | End: 2018-04-30 | Stop reason: SDUPTHER

## 2018-04-30 DIAGNOSIS — I10 ESSENTIAL HYPERTENSION: ICD-10-CM

## 2018-05-01 RX ORDER — FENOFIBRATE 134 MG/1
134 CAPSULE ORAL
Qty: 90 CAPSULE | Refills: 1 | Status: SHIPPED | OUTPATIENT
Start: 2018-05-01 | End: 2018-11-19 | Stop reason: SDUPTHER

## 2018-05-01 RX ORDER — HYDROCHLOROTHIAZIDE 25 MG/1
25 TABLET ORAL DAILY
Qty: 90 TABLET | Refills: 1 | Status: SHIPPED | OUTPATIENT
Start: 2018-05-01 | End: 2018-11-19 | Stop reason: SDUPTHER

## 2018-05-01 RX ORDER — LISINOPRIL 20 MG/1
20 TABLET ORAL DAILY
Qty: 90 TABLET | Refills: 1 | Status: SHIPPED | OUTPATIENT
Start: 2018-05-01 | End: 2018-11-19 | Stop reason: SDUPTHER

## 2018-05-04 DIAGNOSIS — E78.2 MIXED HYPERLIPIDEMIA: ICD-10-CM

## 2018-05-04 RX ORDER — ATORVASTATIN CALCIUM 40 MG/1
20 TABLET, FILM COATED ORAL DAILY
Qty: 45 TABLET | Refills: 1 | Status: SHIPPED | OUTPATIENT
Start: 2018-05-04 | End: 2018-12-20 | Stop reason: SDUPTHER

## 2018-05-21 ENCOUNTER — PATIENT MESSAGE (OUTPATIENT)
Dept: FAMILY MEDICINE CLINIC | Age: 48
End: 2018-05-21

## 2018-05-21 DIAGNOSIS — Z12.11 COLON CANCER SCREENING: Primary | ICD-10-CM

## 2018-05-30 ENCOUNTER — TELEPHONE (OUTPATIENT)
Dept: SURGERY | Age: 48
End: 2018-05-30

## 2018-06-04 ENCOUNTER — CARE COORDINATION (OUTPATIENT)
Dept: CASE MANAGEMENT | Age: 48
End: 2018-06-04

## 2018-06-05 ENCOUNTER — OFFICE VISIT (OUTPATIENT)
Dept: FAMILY MEDICINE CLINIC | Age: 48
End: 2018-06-05

## 2018-06-05 VITALS
OXYGEN SATURATION: 100 % | BODY MASS INDEX: 33.89 KG/M2 | HEART RATE: 78 BPM | DIASTOLIC BLOOD PRESSURE: 86 MMHG | TEMPERATURE: 98.9 F | SYSTOLIC BLOOD PRESSURE: 124 MMHG | WEIGHT: 243 LBS

## 2018-06-05 DIAGNOSIS — D64.9 ANEMIA, UNSPECIFIED TYPE: ICD-10-CM

## 2018-06-05 DIAGNOSIS — E78.2 MIXED HYPERLIPIDEMIA: Primary | ICD-10-CM

## 2018-06-05 DIAGNOSIS — R73.03 PRE-DIABETES: ICD-10-CM

## 2018-06-05 PROCEDURE — 99214 OFFICE O/P EST MOD 30 MIN: CPT | Performed by: FAMILY MEDICINE

## 2018-06-05 ASSESSMENT — ENCOUNTER SYMPTOMS
EYES NEGATIVE: 1
RESPIRATORY NEGATIVE: 1
GASTROINTESTINAL NEGATIVE: 1

## 2018-06-06 ENCOUNTER — TELEPHONE (OUTPATIENT)
Dept: SURGERY | Age: 48
End: 2018-06-06

## 2018-06-06 DIAGNOSIS — R73.03 PRE-DIABETES: ICD-10-CM

## 2018-06-06 DIAGNOSIS — E78.2 MIXED HYPERLIPIDEMIA: ICD-10-CM

## 2018-06-06 DIAGNOSIS — D64.9 ANEMIA, UNSPECIFIED TYPE: ICD-10-CM

## 2018-06-06 LAB
A/G RATIO: 1.8 (ref 1.1–2.2)
ALBUMIN SERPL-MCNC: 3.5 G/DL (ref 3.4–5)
ALP BLD-CCNC: 52 U/L (ref 40–129)
ALT SERPL-CCNC: 59 U/L (ref 10–40)
ANION GAP SERPL CALCULATED.3IONS-SCNC: 10 MMOL/L (ref 3–16)
AST SERPL-CCNC: 37 U/L (ref 15–37)
BANDED NEUTROPHILS RELATIVE PERCENT: 4 % (ref 0–7)
BASOPHILS ABSOLUTE: 0 K/UL (ref 0–0.2)
BASOPHILS RELATIVE PERCENT: 0 %
BILIRUB SERPL-MCNC: <0.2 MG/DL (ref 0–1)
BUN BLDV-MCNC: 10 MG/DL (ref 7–20)
CALCIUM SERPL-MCNC: 8.7 MG/DL (ref 8.3–10.6)
CHLORIDE BLD-SCNC: 105 MMOL/L (ref 99–110)
CHOLESTEROL, TOTAL: 134 MG/DL (ref 0–199)
CO2: 28 MMOL/L (ref 21–32)
CREAT SERPL-MCNC: 0.9 MG/DL (ref 0.9–1.3)
EOSINOPHILS ABSOLUTE: 0.2 K/UL (ref 0–0.6)
EOSINOPHILS RELATIVE PERCENT: 3 %
ESTIMATED AVERAGE GLUCOSE: 114 MG/DL
GFR AFRICAN AMERICAN: >60
GFR NON-AFRICAN AMERICAN: >60
GLOBULIN: 1.9 G/DL
GLUCOSE BLD-MCNC: 94 MG/DL (ref 70–99)
HBA1C MFR BLD: 5.6 %
HCT VFR BLD CALC: 34.3 % (ref 40.5–52.5)
HDLC SERPL-MCNC: 34 MG/DL (ref 40–60)
HEMOGLOBIN: 11.8 G/DL (ref 13.5–17.5)
LDL CHOLESTEROL CALCULATED: 66 MG/DL
LYMPHOCYTES ABSOLUTE: 1.7 K/UL (ref 1–5.1)
LYMPHOCYTES RELATIVE PERCENT: 24 %
MCH RBC QN AUTO: 31.5 PG (ref 26–34)
MCHC RBC AUTO-ENTMCNC: 34.3 G/DL (ref 31–36)
MCV RBC AUTO: 91.8 FL (ref 80–100)
MONOCYTES ABSOLUTE: 0.8 K/UL (ref 0–1.3)
MONOCYTES RELATIVE PERCENT: 12 %
MYELOCYTE PERCENT: 1 %
NEUTROPHILS ABSOLUTE: 4.3 K/UL (ref 1.7–7.7)
NEUTROPHILS RELATIVE PERCENT: 56 %
PDW BLD-RTO: 13.3 % (ref 12.4–15.4)
PLATELET # BLD: 220 K/UL (ref 135–450)
PMV BLD AUTO: 8.4 FL (ref 5–10.5)
POTASSIUM SERPL-SCNC: 4.3 MMOL/L (ref 3.5–5.1)
RBC # BLD: 3.74 M/UL (ref 4.2–5.9)
SLIDE REVIEW: ABNORMAL
SODIUM BLD-SCNC: 143 MMOL/L (ref 136–145)
TOTAL PROTEIN: 5.4 G/DL (ref 6.4–8.2)
TRIGL SERPL-MCNC: 171 MG/DL (ref 0–150)
VLDLC SERPL CALC-MCNC: 34 MG/DL
WBC # BLD: 7 K/UL (ref 4–11)

## 2018-06-12 ENCOUNTER — CARE COORDINATION (OUTPATIENT)
Dept: CASE MANAGEMENT | Age: 48
End: 2018-06-12

## 2018-06-13 ENCOUNTER — TELEPHONE (OUTPATIENT)
Dept: SURGERY | Age: 48
End: 2018-06-13

## 2018-06-13 DIAGNOSIS — K63.89 COLONIC MASS: Primary | ICD-10-CM

## 2018-06-13 RX ORDER — OXYCODONE HYDROCHLORIDE AND ACETAMINOPHEN 5; 325 MG/1; MG/1
1 TABLET ORAL EVERY 6 HOURS PRN
Qty: 28 TABLET | Refills: 0 | Status: SHIPPED | OUTPATIENT
Start: 2018-06-13 | End: 2018-06-20

## 2018-06-14 ENCOUNTER — TELEPHONE (OUTPATIENT)
Dept: SURGERY | Age: 48
End: 2018-06-14

## 2018-06-15 ENCOUNTER — TELEPHONE (OUTPATIENT)
Dept: FAMILY MEDICINE CLINIC | Age: 48
End: 2018-06-15

## 2018-06-15 DIAGNOSIS — K63.5 POLYP OF ASCENDING COLON, UNSPECIFIED TYPE: Primary | ICD-10-CM

## 2018-06-15 RX ORDER — CEPHALEXIN 500 MG/1
500 CAPSULE ORAL 4 TIMES DAILY
Qty: 28 CAPSULE | Refills: 0 | Status: SHIPPED | OUTPATIENT
Start: 2018-06-15 | End: 2018-06-22

## 2018-06-18 LAB
GRAM STAIN RESULT: ABNORMAL
ORGANISM: ABNORMAL
ORGANISM: ABNORMAL
WOUND/ABSCESS: ABNORMAL

## 2018-06-19 ENCOUNTER — OFFICE VISIT (OUTPATIENT)
Dept: SURGERY | Age: 48
End: 2018-06-19

## 2018-06-19 VITALS
HEIGHT: 71 IN | WEIGHT: 235 LBS | RESPIRATION RATE: 16 BRPM | DIASTOLIC BLOOD PRESSURE: 60 MMHG | SYSTOLIC BLOOD PRESSURE: 112 MMHG | BODY MASS INDEX: 32.9 KG/M2

## 2018-06-19 DIAGNOSIS — K63.5 POLYP OF ASCENDING COLON, UNSPECIFIED TYPE: Primary | ICD-10-CM

## 2018-06-19 PROCEDURE — 99024 POSTOP FOLLOW-UP VISIT: CPT | Performed by: SURGERY

## 2018-07-03 ENCOUNTER — OFFICE VISIT (OUTPATIENT)
Dept: SURGERY | Age: 48
End: 2018-07-03

## 2018-07-03 VITALS
DIASTOLIC BLOOD PRESSURE: 86 MMHG | OXYGEN SATURATION: 99 % | WEIGHT: 241 LBS | HEART RATE: 77 BPM | TEMPERATURE: 98 F | SYSTOLIC BLOOD PRESSURE: 131 MMHG | BODY MASS INDEX: 33.61 KG/M2

## 2018-07-03 DIAGNOSIS — K63.5 POLYP OF ASCENDING COLON, UNSPECIFIED TYPE: Primary | ICD-10-CM

## 2018-07-03 PROCEDURE — 99024 POSTOP FOLLOW-UP VISIT: CPT | Performed by: SURGERY

## 2018-07-03 NOTE — LETTER
[facility]) Colorectal Surgery  [address] [facility] [employer]  [address] [address]  Phone: [phone]  Fax: [phone]    Gurpreet Wild MD        July 3, 2018     Patient: Sun Fay   YOB: 1970   Date of Visit: 7/3/2018       To Whom It May Concern: It is my medical opinion that Shane Serna may return to work on 7/9/18 with light duty restrictions of 10 pounds for lifting, pushing, and pulling until 7/23/18. If you have any questions or concerns, please don't hesitate to call.     Sincerely,            Gurpreet Wild MD

## 2018-07-09 ENCOUNTER — TELEPHONE (OUTPATIENT)
Dept: INTERNAL MEDICINE | Age: 48
End: 2018-07-09

## 2018-08-17 ENCOUNTER — OFFICE VISIT (OUTPATIENT)
Dept: INTERNAL MEDICINE | Age: 48
End: 2018-08-17

## 2018-08-17 VITALS
SYSTOLIC BLOOD PRESSURE: 136 MMHG | WEIGHT: 242 LBS | BODY MASS INDEX: 33.88 KG/M2 | HEIGHT: 71 IN | DIASTOLIC BLOOD PRESSURE: 76 MMHG

## 2018-08-17 DIAGNOSIS — I63.9 ISCHEMIC STROKE (HCC): Primary | ICD-10-CM

## 2018-08-17 DIAGNOSIS — R74.8 ELEVATED LIVER ENZYMES: ICD-10-CM

## 2018-08-17 DIAGNOSIS — E78.2 MIXED HYPERLIPIDEMIA: ICD-10-CM

## 2018-08-17 DIAGNOSIS — D64.9 NORMOCYTIC ANEMIA: ICD-10-CM

## 2018-08-17 PROCEDURE — 99214 OFFICE O/P EST MOD 30 MIN: CPT | Performed by: INTERNAL MEDICINE

## 2018-08-17 ASSESSMENT — ENCOUNTER SYMPTOMS
SHORTNESS OF BREATH: 0
VOMITING: 0
NAUSEA: 0
ABDOMINAL PAIN: 0
DIARRHEA: 0
CHEST TIGHTNESS: 0
COUGH: 0
BLOOD IN STOOL: 0

## 2018-08-17 NOTE — PROGRESS NOTES
12/12/2013       Past Surgical History:        Procedure Laterality Date    CERVICAL FUSION      COLECTOMY  05/31/2018     LAPAROSCOPIC, CONVERTED TO OPEN RIGHT COLECTOMY    KNEE ARTHROSCOPY Right [id number]       Family History:   Family History   Problem Relation Age of Onset    Cancer Maternal Grandmother         pancrease    Substance Abuse Father        Social History:   TOBACCO:   reports that he has quit smoking. His smoking use included Cigarettes. He started smoking about 5 years ago. He smoked 0.01 packs per day. He quit smokeless tobacco use about 5 years ago. ETOH:   reports that he drinks about 8.4 - 12.6 oz of alcohol per week . OCCUPATION:   (travels a lot)     Allergies:  Patient has no known allergies. Current Medications:    Prior to Admission medications    Medication Sig Start Date End Date Taking?  Authorizing Provider   Probiotic Product (PROBIOTIC ADVANCED PO) Take 1 tablet by mouth daily   Yes Historical Provider, MD   atorvastatin (LIPITOR) 40 MG tablet Take 0.5 tablets by mouth daily 5/4/18  Yes Henri Camp MD   lisinopril (PRINIVIL;ZESTRIL) 20 MG tablet Take 1 tablet by mouth daily 5/1/18  Yes Henri Camp MD   hydrochlorothiazide (HYDRODIURIL) 25 MG tablet Take 1 tablet by mouth daily 5/1/18  Yes Henri Camp MD   fenofibrate micronized (LOFIBRA) 134 MG capsule Take 1 capsule by mouth every morning (before breakfast) 5/1/18  Yes Henri Camp MD   Milk Thistle 250 MG CAPS Take 280 mg by mouth daily    Yes Historical Provider, MD   Flaxseed, Linseed, (FLAXSEED OIL) 1200 MG CAPS Take 1,200 mg by mouth 2 times daily    Yes Historical Provider, MD   aspirin 81 MG tablet Take 81 mg by mouth daily   Yes Historical Provider, MD   Omega-3 Fatty Acids (FISH OIL) 1200 MG CAPS Take 1,200 mg by mouth 2 times daily    Yes Historical Provider, MD       REVIEW OF SYSTEMS:  Review of Systems   Constitutional: Negative for activity change, appetite change, chills, fever and unexpected weight change. Night sweats 4 / week for a few years    HENT: Negative for congestion. Eyes: Negative for visual disturbance. Respiratory: Negative for cough, chest tightness and shortness of breath. Cardiovascular: Negative for chest pain and palpitations. Gastrointestinal: Negative for abdominal pain, blood in stool, diarrhea, nausea and vomiting. Endocrine: Negative for polyuria. Genitourinary: Negative for difficulty urinating, dysuria and hematuria. Musculoskeletal: Negative for arthralgias. Skin: Negative for rash. Allergic/Immunologic: Negative for immunocompromised state. Neurological: Negative for weakness, numbness and headaches. Hematological: Does not bruise/bleed easily. Psychiatric/Behavioral: Negative for dysphoric mood, self-injury and suicidal ideas. The patient is not nervous/anxious. Screening:    Colon cancer screening: done, after surgery for polyp removal    Prostate cancer screenin2018 normal     Need screening for lung cancer? No    AAA screening needed:  no     Pt encouraged to perform testicular self exams for lumps (cancer prevention) Yes    Need screening for HIV ? No    Last eye exam: 2018-normal       Immunization:    Immunization History   Administered Date(s) Administered    Influenza Virus Vaccine 10/09/2010, 10/04/2017    Influenza Whole 2013    Influenza, Abby Slice, 3 yrs and older, IM, Preservative Free 2016    MMR 2015    Tdap (Boostrix, Adacel) 2011       Physical Exam:      Vitals: /76 (Site: Left Arm)   Ht 5' 11\" (1.803 m)   Wt 242 lb (109.8 kg)   BMI 33.75 kg/m²     Body mass index is 33.75 kg/m². Wt Readings from Last 3 Encounters:   18 242 lb (109.8 kg)   18 241 lb (109.3 kg)   18 235 lb (106.6 kg)     Physical Exam   Constitutional: He is oriented to person, place, and time. He appears well-developed and well-nourished. No distress.    HENT:   Head: Normocephalic and are 2+ on the right side and 2+ on the left side. Patellar reflexes are 2+ on the right side and 2+ on the left side. Reduced sensation R leg (L3-S1), R hand   Skin: No rash noted. He is not diaphoretic. No erythema. Psychiatric: He has a normal mood and affect. His behavior is normal. Thought content normal.        Assessment/Plan:   Corinne Wagner was seen today for establish care. Diagnoses and all orders for this visit:    Ischemic stroke ([address]  Stable, on appropriate med for 2nd prophylaxis. On Aspirin   He will check his BP at home and send me results     Mixed hyperlipidemia  Keep high potency statin    Elevated liver enzymes  Down trending , ALT>AST   U/S- fatty liver  All secondary to fatty liver    Normocytic anemia  Asymptomatic    - Patient was encouraged to call the office or be seen with MD for any worsening or lack    of improvement in his symptoms.       - Pt was asked to schedule an appointment in 3 months, and to let me know of any scheduling difficulties. Additional patients instructions (if given):   Patient Instructions   Please check your blood pressure twice in the morning and twice in the evening for one week. send me results. If blood pressure is higher than 150/90 please let me know as soon as possible. Attached here are instructions of proper blood pressure measurement. Patient Education        Home Blood Pressure Test: About This Test  What is it? A home blood pressure test allows you to keep track of your blood pressure at home. Blood pressure is a measure of the force of blood against the walls of your arteries. Blood pressure readings include two numbers, such as 130/80 (say \"130 over 80\"). The first number is the systolic pressure. The second number is the diastolic pressure. Why is this test done? You may do this test at home to:  · Find out if you have high blood pressure. · Track your blood pressure if you have high blood pressure.   · Track how well medicine is pressure valve just slightly by twisting or pressing the valve on the bulb. · As you watch the pressure slowly fall, note the level on the dial at which you first start to hear a pulsing or tapping sound through the stethoscope. This is your systolic blood pressure. · Continue letting the air out slowly. The sounds will become muffled and will finally disappear. Note the pressure when the sounds completely disappear. This is your diastolic blood pressure. Let out all the remaining air. · Write your numbers in your log book, along with the date and time. What else should you know about the test?  Here are the categories of blood pressure for adults:  Ideal blood pressure. Systolic is less than 701, and diastolic is less than 80. Elevated blood pressure. Systolic is 886 to 275, and diastolic is less than 80. High blood pressure (hypertension). Systolic is 814 or above. Diastolic is 80 or above. One or both numbers may be high. It is more accurate to take the average of several readings made throughout the day than to rely on a single reading. Follow-up care is a key part of your treatment and safety. Be sure to make and go to all appointments, and call your doctor if you are having problems. It's also a good idea to keep a list of the medicines you take. Where can you learn more? Go to [email or web address] org and sign in to your [employer] account. Enter C427 in the [employer] box to learn more about \"Home Blood Pressure Test: About This Test.\"     If you do not have an account, please click on the \"Sign Up Now\" link. Current as of: December 6, 2017  Content Version: 11.7  © 5491-3929 [employer]. Care instructions adapted under license by [facility] ([facility]). If you have questions about a medical condition or this instruction, always ask your healthcare professional. [address] any warranty or liability for your use of this information.

## 2018-08-17 NOTE — PATIENT INSTRUCTIONS
blood pressure monitors  · Press the on/off button on the automatic monitor and wait until the ready-to-measure \"heart\" symbol appears next to zero in the display window. · Press the start button. The cuff will inflate and deflate by itself. · Your blood pressure numbers will appear on the screen. · Write your numbers in your log book, along with the date and time. Manual blood pressure monitors  · Place the earpieces of a stethoscope in your ears, and place the bell of the stethoscope over the artery, just below the cuff. · Close the valve on the rubber inflating bulb. · Squeeze the bulb rapidly with your opposite hand to inflate the cuff until the dial or column of mercury reads about 30 mm Hg higher than your usual systolic pressure. If you do not know your usual pressure, inflate the cuff to 210 mm Hg or until the pulse at your wrist disappears. · Open the pressure valve just slightly by twisting or pressing the valve on the bulb. · As you watch the pressure slowly fall, note the level on the dial at which you first start to hear a pulsing or tapping sound through the stethoscope. This is your systolic blood pressure. · Continue letting the air out slowly. The sounds will become muffled and will finally disappear. Note the pressure when the sounds completely disappear. This is your diastolic blood pressure. Let out all the remaining air. · Write your numbers in your log book, along with the date and time. What else should you know about the test?  Here are the categories of blood pressure for adults:  Ideal blood pressure. Systolic is less than 040, and diastolic is less than 80. Elevated blood pressure. Systolic is 133 to 978, and diastolic is less than 80. High blood pressure (hypertension). Systolic is 494 or above. Diastolic is 80 or above. One or both numbers may be high.   It is more accurate to take the average of several readings made throughout the day than to rely on a single

## 2018-09-11 RX ORDER — LOSARTAN POTASSIUM 50 MG/1
50 TABLET ORAL DAILY
Qty: 30 TABLET | Refills: 3 | Status: SHIPPED | OUTPATIENT
Start: 2018-09-11 | End: 2018-09-12

## 2018-09-12 DIAGNOSIS — I10 ESSENTIAL HYPERTENSION: Primary | ICD-10-CM

## 2018-09-12 RX ORDER — AMLODIPINE BESYLATE 2.5 MG/1
2.5 TABLET ORAL DAILY
Qty: 30 TABLET | Refills: 3 | Status: SHIPPED | OUTPATIENT
Start: 2018-09-12 | End: 2018-10-05 | Stop reason: SDUPTHER

## 2018-10-05 ENCOUNTER — PATIENT MESSAGE (OUTPATIENT)
Dept: INTERNAL MEDICINE | Age: 48
End: 2018-10-05

## 2018-10-05 DIAGNOSIS — I10 ESSENTIAL HYPERTENSION: ICD-10-CM

## 2018-10-05 RX ORDER — AMLODIPINE BESYLATE 2.5 MG/1
2.5 TABLET ORAL DAILY
Qty: 90 TABLET | Refills: 1 | Status: ON HOLD | OUTPATIENT
Start: 2018-10-05 | End: 2019-07-03

## 2018-10-05 NOTE — TELEPHONE ENCOUNTER
From: Gonzales Sam  To: Avani Hammond MD  Sent: 10/5/2018 7:02 AM EDT  Subject: Hollie Story Buys,    I am almost out of my Amldoipine and I would like to have it re-filled through [facility] so that I can get a 90 day prescription. Are you able to set this up?     Thank you,  Dayami Sterling

## 2018-11-18 ENCOUNTER — PATIENT MESSAGE (OUTPATIENT)
Dept: INTERNAL MEDICINE | Age: 48
End: 2018-11-18

## 2018-11-18 DIAGNOSIS — I10 ESSENTIAL HYPERTENSION: ICD-10-CM

## 2018-11-18 DIAGNOSIS — R74.8 ELEVATED LIVER ENZYMES: Primary | ICD-10-CM

## 2018-11-18 DIAGNOSIS — E78.2 MIXED HYPERLIPIDEMIA: ICD-10-CM

## 2018-11-19 ENCOUNTER — OFFICE VISIT (OUTPATIENT)
Dept: INTERNAL MEDICINE CLINIC | Age: 48
End: 2018-11-19
Payer: COMMERCIAL

## 2018-11-19 VITALS
BODY MASS INDEX: 34.52 KG/M2 | RESPIRATION RATE: 16 BRPM | HEART RATE: 82 BPM | HEIGHT: 71 IN | OXYGEN SATURATION: 97 % | DIASTOLIC BLOOD PRESSURE: 88 MMHG | WEIGHT: 246.6 LBS | SYSTOLIC BLOOD PRESSURE: 122 MMHG

## 2018-11-19 DIAGNOSIS — M17.11 PRIMARY OSTEOARTHRITIS OF RIGHT KNEE: ICD-10-CM

## 2018-11-19 DIAGNOSIS — E78.2 MIXED HYPERLIPIDEMIA: ICD-10-CM

## 2018-11-19 DIAGNOSIS — Z01.818 PRE-OP EXAM: Primary | ICD-10-CM

## 2018-11-19 DIAGNOSIS — I10 ESSENTIAL HYPERTENSION: ICD-10-CM

## 2018-11-19 PROCEDURE — 99243 OFF/OP CNSLTJ NEW/EST LOW 30: CPT | Performed by: INTERNAL MEDICINE

## 2018-11-19 RX ORDER — HYDROCHLOROTHIAZIDE 25 MG/1
25 TABLET ORAL DAILY
Qty: 90 TABLET | Refills: 2 | Status: SHIPPED | OUTPATIENT
Start: 2018-11-19 | End: 2021-09-20 | Stop reason: SDUPTHER

## 2018-11-19 RX ORDER — FENOFIBRATE 134 MG/1
134 CAPSULE ORAL
Qty: 90 CAPSULE | Refills: 2 | Status: SHIPPED | OUTPATIENT
Start: 2018-11-19 | End: 2021-05-25

## 2018-11-19 RX ORDER — LISINOPRIL 20 MG/1
20 TABLET ORAL DAILY
Qty: 90 TABLET | Refills: 1 | Status: SHIPPED | OUTPATIENT
Start: 2018-11-19 | End: 2021-05-25

## 2018-11-19 NOTE — TELEPHONE ENCOUNTER
From: Greg Stack  To: Shreya Leos MD  Sent: 11/18/2018 6:38 PM EST  Subject: Prescription Gracia Zavala,    I am just about out of 3 of my prescriptions which are all 90 day re-fills with [facility]. 1) Fenofibrate  2) hydrochlorothiazide  3) lisinopril    Can you please call these in for re-fills as soon as possible? Also, since it's been so difficult scheduling an appointment with you since you are so booked, I have an appointment tomorrow, 11/19 with Dr Graham Braswell so I'm wondering if I can get paperwork for bloodwork update then?     Thanks  [facility]

## 2018-11-19 NOTE — PROGRESS NOTES
tablet 1    Milk Thistle 250 MG CAPS Take 280 mg by mouth daily       Flaxseed, Linseed, (FLAXSEED OIL) 1200 MG CAPS Take 1,200 mg by mouth 2 times daily       aspirin 81 MG tablet Take 81 mg by mouth daily      Omega-3 Fatty Acids (FISH OIL) 1200 MG CAPS Take 1,200 mg by mouth 2 times daily        No current facility-administered medications for this visit. No Known Allergies    Social History   Substance Use Topics    Smoking status: Former Smoker     Packs/day: 0.01     Types: Cigarettes     Start date: 12/19/2012    Smokeless tobacco: Former User     Quit date: 8/29/2012    Alcohol use 8.4 - 12.6 oz/week     14 - 21 Glasses of wine per week      Comment: 2-3 glasses of wine everynight,         Family History   Problem Relation Age of Onset    Cancer Maternal Grandmother         pancreas    Substance Abuse Father         Review Of Systems    Skin: no abnormal pigmentation, rash, scaling, itching, masses, hair or nail changes  Eyes: negative  Ears/Nose/Throat: negative  Respiratory: negative  Cardiovascular: negative  Gastrointestinal: negative  Genitourinary: negative  Musculoskeletal: right knee DJD  Neurologic: decreased sensation right side, with both hypersensitivity and numbness feelings  Psychiatric: negative  Hematologic/Lymphatic/Immunologic: negative  Endocrine: negative    PHYSICAL EXAMINATION:  /88   Pulse 82   Resp 16   Ht 5' 11\" (1.803 m)   Wt 246 lb 9.6 oz (111.9 kg)   SpO2 97%   BMI 34.39 kg/m²   General appearance - healthy, alert, no distress  Skin - Skin color, texture, turgor normal. No rashes or lesions. Head - Normocephalic. No masses, lesions, tenderness or abnormalities  Eyes - conjunctivae/corneas clear. PERRL, EOM's intact. Ears - External ears normal. Canals clear. TM's normal.  Nose/Sinuses - Nares normal. Septum midline. Mucosa normal. No drainage or sinus tenderness.   Oropharynx - Lips, mucosa, and tongue normal. Teeth and gums normal. Orop  Neck - Neck

## 2018-11-23 DIAGNOSIS — I10 ESSENTIAL HYPERTENSION: ICD-10-CM

## 2018-11-23 DIAGNOSIS — R74.8 ELEVATED LIVER ENZYMES: ICD-10-CM

## 2018-11-23 DIAGNOSIS — E78.2 MIXED HYPERLIPIDEMIA: ICD-10-CM

## 2018-11-23 LAB
A/G RATIO: 1.9 (ref 1.1–2.2)
ALBUMIN SERPL-MCNC: 3.9 G/DL (ref 3.4–5)
ALP BLD-CCNC: 55 U/L (ref 40–129)
ALT SERPL-CCNC: 129 U/L (ref 10–40)
ANION GAP SERPL CALCULATED.3IONS-SCNC: 14 MMOL/L (ref 3–16)
AST SERPL-CCNC: 48 U/L (ref 15–37)
BASOPHILS ABSOLUTE: 0.1 K/UL (ref 0–0.2)
BASOPHILS RELATIVE PERCENT: 0.9 %
BILIRUB SERPL-MCNC: <0.2 MG/DL (ref 0–1)
BUN BLDV-MCNC: 9 MG/DL (ref 7–20)
CALCIUM SERPL-MCNC: 8.9 MG/DL (ref 8.3–10.6)
CHLORIDE BLD-SCNC: 108 MMOL/L (ref 99–110)
CHOLESTEROL, TOTAL: 157 MG/DL (ref 0–199)
CO2: 26 MMOL/L (ref 21–32)
CREAT SERPL-MCNC: 0.9 MG/DL (ref 0.9–1.3)
EOSINOPHILS ABSOLUTE: 0.1 K/UL (ref 0–0.6)
EOSINOPHILS RELATIVE PERCENT: 1.7 %
GFR AFRICAN AMERICAN: >60
GFR NON-AFRICAN AMERICAN: >60
GLOBULIN: 2.1 G/DL
GLUCOSE BLD-MCNC: 133 MG/DL (ref 70–99)
HCT VFR BLD CALC: 43.4 % (ref 40.5–52.5)
HDLC SERPL-MCNC: 49 MG/DL (ref 40–60)
HEMOGLOBIN: 14.8 G/DL (ref 13.5–17.5)
LDL CHOLESTEROL CALCULATED: 61 MG/DL
LYMPHOCYTES ABSOLUTE: 2.4 K/UL (ref 1–5.1)
LYMPHOCYTES RELATIVE PERCENT: 37.2 %
MCH RBC QN AUTO: 30.9 PG (ref 26–34)
MCHC RBC AUTO-ENTMCNC: 34.1 G/DL (ref 31–36)
MCV RBC AUTO: 90.7 FL (ref 80–100)
MONOCYTES ABSOLUTE: 0.5 K/UL (ref 0–1.3)
MONOCYTES RELATIVE PERCENT: 8.5 %
NEUTROPHILS ABSOLUTE: 3.3 K/UL (ref 1.7–7.7)
NEUTROPHILS RELATIVE PERCENT: 51.7 %
PDW BLD-RTO: 12.8 % (ref 12.4–15.4)
PLATELET # BLD: 149 K/UL (ref 135–450)
PMV BLD AUTO: 9.4 FL (ref 5–10.5)
POTASSIUM SERPL-SCNC: 4.4 MMOL/L (ref 3.5–5.1)
RBC # BLD: 4.78 M/UL (ref 4.2–5.9)
SODIUM BLD-SCNC: 148 MMOL/L (ref 136–145)
TOTAL PROTEIN: 6 G/DL (ref 6.4–8.2)
TRIGL SERPL-MCNC: 235 MG/DL (ref 0–150)
VLDLC SERPL CALC-MCNC: 47 MG/DL
WBC # BLD: 6.4 K/UL (ref 4–11)

## 2018-11-24 DIAGNOSIS — R74.8 ELEVATED LIVER ENZYMES: Primary | ICD-10-CM

## 2018-12-20 DIAGNOSIS — E78.2 MIXED HYPERLIPIDEMIA: ICD-10-CM

## 2018-12-20 RX ORDER — ATORVASTATIN CALCIUM 40 MG/1
20 TABLET, FILM COATED ORAL DAILY
Qty: 45 TABLET | Refills: 3 | Status: SHIPPED | OUTPATIENT
Start: 2018-12-20 | End: 2021-12-22 | Stop reason: SDUPTHER

## 2019-03-27 ENCOUNTER — TELEPHONE (OUTPATIENT)
Dept: SURGERY | Age: 49
End: 2019-03-27

## 2019-06-12 ENCOUNTER — TELEPHONE (OUTPATIENT)
Dept: SURGERY | Age: 49
End: 2019-06-12

## 2019-06-12 NOTE — TELEPHONE ENCOUNTER
Spoke with patient and changed time of colonoscopy on 7/03/2019 to 8:00 a.m. and arrival time 6:30 a.m. (Dr. Jose Mera needs to open up some extra office time that day.) Marked applicable changes on surgery order and faxed to [facility]. E-mailed patient the time changes plus how this effected when he starts his prep the day of his colonoscopy.

## 2019-06-13 DIAGNOSIS — Z12.11 ENCOUNTER FOR SCREENING COLONOSCOPY: Primary | ICD-10-CM

## 2019-07-01 ENCOUNTER — TELEPHONE (OUTPATIENT)
Dept: SURGERY | Age: 49
End: 2019-07-01

## 2019-07-01 NOTE — TELEPHONE ENCOUNTER
Spoke with patient directly and confirmed colonoscopy for Wednesday at [facility] at 8:00 a.m. arrival time 6:30 a.m. Patient knows to avoid eating any solid foods tomorrow and drink plenty of the \"approved\" liquids. He knows to start drinking the GoLytely at 4:00 p.m. tomorrow. He does have someone going with him to the hospital on Wednesday that can drive him home after the procedure. He has temporarily stopped taking his aspirin and fish oil supplement. Patient has my direct number in case of any questions.

## 2019-07-02 ENCOUNTER — ANESTHESIA EVENT (OUTPATIENT)
Dept: ENDOSCOPY | Age: 49
End: 2019-07-02
Payer: COMMERCIAL

## 2019-07-02 ENCOUNTER — TELEPHONE (OUTPATIENT)
Dept: SURGERY | Age: 49
End: 2019-07-02

## 2019-07-03 ENCOUNTER — HOSPITAL ENCOUNTER (OUTPATIENT)
Age: 49
Setting detail: OUTPATIENT SURGERY
Discharge: HOME | End: 2019-07-03
Attending: SURGERY | Admitting: SURGERY
Payer: COMMERCIAL

## 2019-07-03 ENCOUNTER — ANESTHESIA (OUTPATIENT)
Dept: ENDOSCOPY | Age: 49
End: 2019-07-03
Payer: COMMERCIAL

## 2019-07-03 VITALS
DIASTOLIC BLOOD PRESSURE: 107 MMHG | OXYGEN SATURATION: 98 % | SYSTOLIC BLOOD PRESSURE: 175 MMHG | RESPIRATION RATE: 25 BRPM

## 2019-07-03 VITALS
OXYGEN SATURATION: 98 % | RESPIRATION RATE: 19 BRPM | WEIGHT: 246 LBS | DIASTOLIC BLOOD PRESSURE: 72 MMHG | SYSTOLIC BLOOD PRESSURE: 118 MMHG | TEMPERATURE: 97.9 F | HEART RATE: 57 BPM | HEIGHT: 71 IN | BODY MASS INDEX: 34.44 KG/M2

## 2019-07-03 PROCEDURE — 3700000000 HC ANESTHESIA ATTENDED CARE: Performed by: SURGERY

## 2019-07-03 PROCEDURE — 7100000010 HC PHASE II RECOVERY - FIRST 15 MIN: Performed by: SURGERY

## 2019-07-03 PROCEDURE — 2580000003 HC RX 258: Performed by: NURSE ANESTHETIST, CERTIFIED REGISTERED

## 2019-07-03 PROCEDURE — 3700000001 HC ADD 15 MINUTES (ANESTHESIA): Performed by: SURGERY

## 2019-07-03 PROCEDURE — 7100000011 HC PHASE II RECOVERY - ADDTL 15 MIN: Performed by: SURGERY

## 2019-07-03 PROCEDURE — 6360000002 HC RX W HCPCS: Performed by: NURSE ANESTHETIST, CERTIFIED REGISTERED

## 2019-07-03 PROCEDURE — 3609027000 HC COLONOSCOPY: Performed by: SURGERY

## 2019-07-03 PROCEDURE — 45378 DIAGNOSTIC COLONOSCOPY: CPT | Performed by: SURGERY

## 2019-07-03 RX ORDER — SODIUM CHLORIDE, SODIUM LACTATE, POTASSIUM CHLORIDE, CALCIUM CHLORIDE 600; 310; 30; 20 MG/100ML; MG/100ML; MG/100ML; MG/100ML
INJECTION, SOLUTION INTRAVENOUS CONTINUOUS PRN
Status: DISCONTINUED | OUTPATIENT
Start: 2019-07-03 | End: 2019-07-03 | Stop reason: SDUPTHER

## 2019-07-03 RX ORDER — LIDOCAINE HYDROCHLORIDE 20 MG/ML
INJECTION, SOLUTION INTRAVENOUS PRN
Status: DISCONTINUED | OUTPATIENT
Start: 2019-07-03 | End: 2019-07-03 | Stop reason: SDUPTHER

## 2019-07-03 RX ORDER — PROPOFOL 10 MG/ML
INJECTION, EMULSION INTRAVENOUS PRN
Status: DISCONTINUED | OUTPATIENT
Start: 2019-07-03 | End: 2019-07-03 | Stop reason: SDUPTHER

## 2019-07-03 RX ADMIN — PROPOFOL 20 MG: 10 INJECTION, EMULSION INTRAVENOUS at 07:23

## 2019-07-03 RX ADMIN — PROPOFOL 50 MG: 10 INJECTION, EMULSION INTRAVENOUS at 07:18

## 2019-07-03 RX ADMIN — PROPOFOL 20 MG: 10 INJECTION, EMULSION INTRAVENOUS at 07:22

## 2019-07-03 RX ADMIN — PROPOFOL 30 MG: 10 INJECTION, EMULSION INTRAVENOUS at 07:19

## 2019-07-03 RX ADMIN — PROPOFOL 10 MG: 10 INJECTION, EMULSION INTRAVENOUS at 07:26

## 2019-07-03 RX ADMIN — SODIUM CHLORIDE, SODIUM LACTATE, POTASSIUM CHLORIDE, AND CALCIUM CHLORIDE: 600; 310; 30; 20 INJECTION, SOLUTION INTRAVENOUS at 07:15

## 2019-07-03 RX ADMIN — PROPOFOL 20 MG: 10 INJECTION, EMULSION INTRAVENOUS at 07:20

## 2019-07-03 RX ADMIN — PROPOFOL 50 MG: 10 INJECTION, EMULSION INTRAVENOUS at 07:16

## 2019-07-03 RX ADMIN — PROPOFOL 20 MG: 10 INJECTION, EMULSION INTRAVENOUS at 07:25

## 2019-07-03 RX ADMIN — PROPOFOL 50 MG: 10 INJECTION, EMULSION INTRAVENOUS at 07:17

## 2019-07-03 RX ADMIN — LIDOCAINE HYDROCHLORIDE 30 MG: 20 INJECTION, SOLUTION INTRAVENOUS at 07:16

## 2019-07-03 ASSESSMENT — PAIN SCALES - GENERAL
PAINLEVEL_OUTOF10: 0

## 2019-07-03 ASSESSMENT — PAIN - FUNCTIONAL ASSESSMENT: PAIN_FUNCTIONAL_ASSESSMENT: 0-10

## 2019-07-03 ASSESSMENT — PULMONARY FUNCTION TESTS
PIF_VALUE: 0
PIF_VALUE: 2
PIF_VALUE: 0

## 2019-07-03 NOTE — H&P
Flaxseed, Linseed, (FLAXSEED OIL) 1200 MG CAPS Take 1,200 mg by mouth 2 times daily    Yes Historical Provider, MD   aspirin 81 MG tablet Take 81 mg by mouth daily    Historical Provider, MD     No Known Allergies  Past Medical History:   Diagnosis Date    Allergic rhinitis     Allergic rhinitis due to pollen 1/20/2016    Cerebrovascular accident ([facility] [address] 7/16/2015    Cervical herniated disc     Cervical radiculopathy 5/23/2014    Chicken pox     Chondromalacia of patella 12/12/2013     replace inactive diagnosis    Degenerative joint disease of knee 12/12/2013    Elevated liver enzymes     Hyperlipidemia     Osteoarthritis of cervical spine 1/20/2016    Plantar fasciitis of right foot 10/19/2015    Primary osteoarthritis of both knees 1/20/2016    Sprain and strain of medial collateral ligament of knee 12/12/2013     Past Surgical History:   Procedure Laterality Date    CERVICAL FUSION      COLECTOMY  05/31/2018     LAPAROSCOPIC, CONVERTED TO OPEN RIGHT COLECTOMY    KNEE ARTHROSCOPY Right [id number]       Physical Exam:     /82   Pulse 68   Temp 97.9 °F (36.6 °C)   Resp 18   Ht 5' 11\" (1.803 m)   Wt 246 lb (111.6 kg)   SpO2 97%   BMI 34.31 kg/m²  Body mass index is 34.31 kg/m². Constitutional: Appears well-developed and well-nourished. Grooming appropriate. Head: Normocephalic, atraumatic. Eyes: No scleral icterus. Vision intact grossly. ENT: Hearing grossly intact. No facial deformity. Cardiovascular: Normal rate on monitor. Pulmonary/Chest: Effort normal. No respiratory distress. No wheezes. No use of accessory muscles. Musculoskeletal: Normal range of motion of UE. No gross deformity. Neurological: Alert and oriented to person, place, and time. No gross deficits. Psychiatric: Normal mood and affect. Behavior normal. Oriented to person, place, and time.   Abdomen: soft, NTTP, non distended    Recent labs/radiology reviewed as appropriate    Assessment/Plan:     Previous

## 2019-07-03 NOTE — ANESTHESIA PRE PROCEDURE
Department of Anesthesiology  Preprocedure Note       Name:  Nam Akers   Age:  44 y.o.  :  1970                                          MRN:  [id number]         Date:  7/3/2019      Surgeon: Terri Anne):  Tatyana Bautista MD    Procedure: COLONOSCOPY, POSSIBLE POLYPECTOMY WITH MAC (N/A )    Medications prior to admission:   Prior to Admission medications    Medication Sig Start Date End Date Taking? Authorizing Provider   polyethylene glycol (GOLYTELY) 236 g solution Please follow prep instructions provided by doctor's office. 19  Yes Tatyana Bautista MD   atorvastatin (LIPITOR) 40 MG tablet Take 0.5 tablets by mouth daily 18  Yes Charissa Cordero MD   fenofibrate micronized (LOFIBRA) 134 MG capsule Take 1 capsule by mouth every morning (before breakfast) 18  Yes Charissa Cordero MD   hydrochlorothiazide (HYDRODIURIL) 25 MG tablet Take 1 tablet by mouth daily 18  Yes Charissa Cordero MD   lisinopril (PRINIVIL;ZESTRIL) 20 MG tablet Take 1 tablet by mouth daily 18  Yes Charissa Cordero MD   Milk Thistle 250 MG CAPS Take 280 mg by mouth daily    Yes Historical Provider, MD   Flaxseed, Linseed, (FLAXSEED OIL) 1200 MG CAPS Take 1,200 mg by mouth 2 times daily    Yes Historical Provider, MD   aspirin 81 MG tablet Take 81 mg by mouth daily    Historical Provider, MD       Current medications:    No current facility-administered medications for this encounter.         Allergies:  No Known Allergies    Problem List:    Patient Active Problem List   Diagnosis Code    Elevated liver enzymes R74.8    Acute meniscal tear of knee S83.209A    Degenerative joint disease of knee M17.10    Chondromalacia of patella M22.40    Sprain and strain of medial collateral ligament of knee S83.419A    Tear of medial cartilage or meniscus of knee, current [id number]    Cervical radiculopathy M54.12    Numbness and tingling of right side of face R20.0, R20.2    Arterial ischemic Pulmonary:Negative Pulmonary ROS   (+) sleep apnea:                             Cardiovascular:                      Neuro/Psych:   (+) CVA:,             GI/Hepatic/Renal: Neg GI/Hepatic/Renal ROS            Endo/Other: Negative Endo/Other ROS                    Abdominal:           Vascular:                                        Anesthesia Plan      MAC     ASA 3       Induction: intravenous. Anesthetic plan and risks discussed with patient. Plan discussed with CRNA.     Attending anesthesiologist reviewed and agrees with Tamiko Wallace MD   7/3/2019

## 2019-07-03 NOTE — ANESTHESIA POSTPROCEDURE EVALUATION
Department of Anesthesiology  Postprocedure Note    Patient: Yonathan Ponce  MRN: [id number]  YOB: 1970  Date of evaluation: 7/3/2019  Time:  8:02 AM     Procedure Summary     Date:  07/03/19 Room / Location:  [facility]  / [facility]    Anesthesia Start:  0715 Anesthesia Stop:  4727    Procedure:  COLONOSCOPY, POSSIBLE POLYPECTOMY WITH MAC (N/A ) Diagnosis:  (SCREENING FOR COLON CANCER  Z12.11)    Surgeon:  Odin Sanchez MD Responsible Provider:  Juan Diego Wheeler MD    Anesthesia Type:  MAC ASA Status:  3          Anesthesia Type: MAC    Gibran Phase I: Gibran Score: 10    Gibran Phase II: Gibran Score: 10    Last vitals: Reviewed and per EMR flowsheets.        Anesthesia Post Evaluation    Patient location during evaluation: PACU  Patient participation: complete - patient participated  Level of consciousness: awake and alert  Airway patency: patent  Nausea & Vomiting: no vomiting and no nausea  Complications: no  Cardiovascular status: hemodynamically stable  Respiratory status: acceptable  Hydration status: euvolemic

## 2019-08-19 ENCOUNTER — PROCEDURE VISIT (OUTPATIENT)
Dept: SURGERY | Age: 49
End: 2019-08-19
Payer: COMMERCIAL

## 2019-08-19 VITALS
OXYGEN SATURATION: 95 % | DIASTOLIC BLOOD PRESSURE: 87 MMHG | BODY MASS INDEX: 35.7 KG/M2 | WEIGHT: 256 LBS | SYSTOLIC BLOOD PRESSURE: 136 MMHG | HEART RATE: 73 BPM | TEMPERATURE: 98.8 F

## 2019-08-19 DIAGNOSIS — C44.311 BASAL CELL CARCINOMA OF LEFT SIDE OF NOSE: Primary | ICD-10-CM

## 2019-08-19 PROCEDURE — 12051 INTMD RPR FACE/MM 2.5 CM/<: CPT | Performed by: DERMATOLOGY

## 2019-08-19 PROCEDURE — 17311 MOHS 1 STAGE H/N/HF/G: CPT | Performed by: DERMATOLOGY

## 2019-08-20 ENCOUNTER — TELEPHONE (OUTPATIENT)
Dept: SURGERY | Age: 49
End: 2019-08-20

## 2019-08-26 ENCOUNTER — NURSE ONLY (OUTPATIENT)
Dept: SURGERY | Age: 49
End: 2019-08-26

## 2019-08-26 DIAGNOSIS — Z48.02 VISIT FOR SUTURE REMOVAL: Primary | ICD-10-CM

## 2019-08-26 NOTE — PROGRESS NOTES
S:  The patient is here for suture removal s/p Mohs surgery on the left nasal tip and Intermediate layered closure repair, 1 week ago. The site appears well-healed without signs of infection (redness, pain or discharge). The sutures were removed. Wound care and activity instructions given. The patient was scheduled for follow-up prn for scar/wound check. The patient was scheduled for f/u with General Dermatology per their instructions.

## 2019-09-23 ENCOUNTER — TELEPHONE (OUTPATIENT)
Dept: SURGERY | Age: 49
End: 2019-09-23

## 2019-09-23 NOTE — TELEPHONE ENCOUNTER
PT has a spitting suture and would like an appt. Pt can only come in M & F; if this is a RN visit, please call and offer patient an appt; if MD visit, please check with Dr. Julissa Loomis if pt can come in today due to his work schedule the rest of there week.

## 2019-09-25 ENCOUNTER — NURSE ONLY (OUTPATIENT)
Dept: SURGERY | Age: 49
End: 2019-09-25

## 2019-09-25 DIAGNOSIS — Z51.89 VISIT FOR WOUND CHECK: Primary | ICD-10-CM

## 2019-09-25 PROBLEM — T81.30XA SPITTING SUTURE: Status: ACTIVE | Noted: 2019-09-25

## 2019-09-25 PROBLEM — T81.31XA SPITTING SUTURE: Status: ACTIVE | Noted: 2019-09-25

## 2021-05-24 PROBLEM — K76.0 NAFLD (NONALCOHOLIC FATTY LIVER DISEASE): Status: ACTIVE | Noted: 2019-03-08

## 2021-05-24 PROBLEM — E66.9 OBESITY (BMI 30.0-34.9): Status: ACTIVE | Noted: 2019-03-08

## 2021-05-24 PROBLEM — I10 HYPERTENSION: Status: ACTIVE | Noted: 2018-12-13

## 2021-05-24 PROBLEM — Z90.49 H/O RIGHT HEMICOLECTOMY: Status: ACTIVE | Noted: 2018-12-13

## 2021-05-24 PROBLEM — E66.811 OBESITY (BMI 30.0-34.9): Status: ACTIVE | Noted: 2019-03-08

## 2021-05-24 PROBLEM — E55.9 VITAMIN D DEFICIENCY: Status: ACTIVE | Noted: 2018-12-14

## 2021-05-25 ENCOUNTER — OFFICE VISIT (OUTPATIENT)
Dept: PRIMARY CARE CLINIC | Age: 51
End: 2021-05-25
Payer: COMMERCIAL

## 2021-05-25 VITALS
BODY MASS INDEX: 34.76 KG/M2 | WEIGHT: 242.8 LBS | HEIGHT: 70 IN | HEART RATE: 72 BPM | DIASTOLIC BLOOD PRESSURE: 76 MMHG | SYSTOLIC BLOOD PRESSURE: 122 MMHG

## 2021-05-25 DIAGNOSIS — I10 ESSENTIAL HYPERTENSION: ICD-10-CM

## 2021-05-25 DIAGNOSIS — I63.40 CEREBROVASCULAR ACCIDENT (CVA) DUE TO EMBOLISM OF CEREBRAL ARTERY (HCC): ICD-10-CM

## 2021-05-25 DIAGNOSIS — K76.0 NAFLD (NONALCOHOLIC FATTY LIVER DISEASE): ICD-10-CM

## 2021-05-25 DIAGNOSIS — Z87.39 HISTORY OF LEFT TENNIS ELBOW: ICD-10-CM

## 2021-05-25 DIAGNOSIS — E55.9 VITAMIN D DEFICIENCY: ICD-10-CM

## 2021-05-25 DIAGNOSIS — Z00.00 WELL ADULT EXAM: Primary | ICD-10-CM

## 2021-05-25 DIAGNOSIS — G47.00 INSOMNIA, UNSPECIFIED TYPE: ICD-10-CM

## 2021-05-25 DIAGNOSIS — I63.9 ISCHEMIC STROKE (HCC): ICD-10-CM

## 2021-05-25 LAB
A/G RATIO: 1.8 (ref 1.1–2.2)
ALBUMIN SERPL-MCNC: 4.6 G/DL (ref 3.4–5)
ALP BLD-CCNC: 96 U/L (ref 40–129)
ALT SERPL-CCNC: 79 U/L (ref 10–40)
ANION GAP SERPL CALCULATED.3IONS-SCNC: 13 MMOL/L (ref 3–16)
AST SERPL-CCNC: 44 U/L (ref 15–37)
BILIRUB SERPL-MCNC: 0.4 MG/DL (ref 0–1)
BUN BLDV-MCNC: 9 MG/DL (ref 7–20)
CALCIUM SERPL-MCNC: 9.6 MG/DL (ref 8.3–10.6)
CHLORIDE BLD-SCNC: 99 MMOL/L (ref 99–110)
CHOLESTEROL, TOTAL: 173 MG/DL (ref 0–199)
CO2: 27 MMOL/L (ref 21–32)
CREAT SERPL-MCNC: 0.7 MG/DL (ref 0.9–1.3)
GFR AFRICAN AMERICAN: >60
GFR NON-AFRICAN AMERICAN: >60
GLOBULIN: 2.6 G/DL
GLUCOSE BLD-MCNC: 127 MG/DL (ref 70–99)
HDLC SERPL-MCNC: 67 MG/DL (ref 40–60)
LDL CHOLESTEROL CALCULATED: 65 MG/DL
POTASSIUM SERPL-SCNC: 3.7 MMOL/L (ref 3.5–5.1)
SODIUM BLD-SCNC: 139 MMOL/L (ref 136–145)
TOTAL PROTEIN: 7.2 G/DL (ref 6.4–8.2)
TRIGL SERPL-MCNC: 203 MG/DL (ref 0–150)
VITAMIN D 25-HYDROXY: 44.1 NG/ML
VLDLC SERPL CALC-MCNC: 41 MG/DL

## 2021-05-25 PROCEDURE — 99204 OFFICE O/P NEW MOD 45 MIN: CPT | Performed by: FAMILY MEDICINE

## 2021-05-25 PROCEDURE — 99386 PREV VISIT NEW AGE 40-64: CPT | Performed by: FAMILY MEDICINE

## 2021-05-25 RX ORDER — AMOXICILLIN 500 MG
1200 CAPSULE ORAL 2 TIMES DAILY
COMMUNITY
End: 2021-05-25

## 2021-05-25 RX ORDER — ESZOPICLONE 2 MG/1
2 TABLET, FILM COATED ORAL NIGHTLY
COMMUNITY
Start: 2021-04-29 | End: 2021-05-29

## 2021-05-25 RX ORDER — AMLODIPINE BESYLATE AND BENAZEPRIL HYDROCHLORIDE 10; 20 MG/1; MG/1
CAPSULE ORAL
COMMUNITY
Start: 2021-03-04 | End: 2021-12-22 | Stop reason: SDUPTHER

## 2021-05-25 ASSESSMENT — ENCOUNTER SYMPTOMS
COLOR CHANGE: 0
SORE THROAT: 0
EYE PAIN: 0
RHINORRHEA: 0
ABDOMINAL PAIN: 0
SHORTNESS OF BREATH: 0
CONSTIPATION: 0
NAUSEA: 0
DIARRHEA: 0
VOMITING: 0
COUGH: 0

## 2021-05-25 ASSESSMENT — PATIENT HEALTH QUESTIONNAIRE - PHQ9
2. FEELING DOWN, DEPRESSED OR HOPELESS: 0
SUM OF ALL RESPONSES TO PHQ QUESTIONS 1-9: 0

## 2021-05-25 NOTE — PROGRESS NOTES
Chief Complaint   Patient presents with    Established New Doctor    Hypertension    Hyperlipidemia    Obesity       HPI:Micheal Kruger presents for evaluation and management of well adult check and evaluation of multiple medical problems. Taz Lay notes that he gets exercise by walking his dogs daily. He uses his elliptical machine 4-5 times a week. 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 He gets about 9 hours of time in bed but tells me his sleep is very restless and he thinks he only gets about 4 quality hours asleep. He does have sleep apnea and uses a CPAP. He eats about 4 servings of vegetables a day and has excellent social support. He drinks anywhere from 2 to 3 glasses of wine most days of the week. He has a history of a stroke in 2015. 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 He received TPA and has very minimal residual neurologic deficits. Notes some relative numbness and hypersensitivity on the right side of his body. He has a history of hypertension that has been fairly well controlled with medication. Reports his home blood pressure readings are reading in the 924C to 981G systolic. He has a history of hypercholesterolemia and is currently taking medication for this. He has a history of nonalcoholic steatohepatitis with elevated liver enzymes which is managing with diet. He has a history of chronic right-sided sciatica. He manages this with medical marijuana which helps \"enough \". He has seen pain management in the past for this and really did not like gabapentin. He notes a 4-week history of left elbow pain worse with use. Review of Systems   Constitutional: Negative for chills and fever. HENT: Negative for ear pain, rhinorrhea and sore throat. Eyes: Negative for pain and visual disturbance.    Respiratory: Negative for cough and shortness of breath. Cardiovascular: Negative for chest pain and palpitations. Gastrointestinal: Negative for abdominal pain, constipation, diarrhea, nausea and vomiting. Genitourinary: Negative for dysuria and frequency. Musculoskeletal: Positive for arthralgias. Negative for joint swelling and myalgias. Skin: Negative for color change and rash. Neurological: Negative for weakness, numbness and headaches. Right-sided sciatica   Hematological: Negative for adenopathy. Does not bruise/bleed easily. Psychiatric/Behavioral: Negative for dysphoric mood, self-injury and suicidal ideas. The patient is not nervous/anxious. No Known Allergies  New Prescriptions    No medications on file     Current Outpatient Medications   Medication Sig Dispense Refill    amLODIPine-benazepril (LOTREL) 10-20 MG per capsule       vitamin D 25 MCG (1000 UT) CAPS Take 1,000 Units by mouth daily      eszopiclone (LUNESTA) 2 MG TABS Take 2 mg by mouth nightly.  Omega-3 Fatty Acids (FISH OIL) 1200 MG CAPS Take 1,200 mg by mouth 2 times daily      Barberry-Oreg Grape-Goldenseal (BERBERINE COMPLEX) 200-200-50 MG CAPS Take by mouth daily      atorvastatin (LIPITOR) 40 MG tablet Take 0.5 tablets by mouth daily 45 tablet 3    hydrochlorothiazide (HYDRODIURIL) 25 MG tablet Take 1 tablet by mouth daily 90 tablet 2    aspirin 81 MG tablet Take 81 mg by mouth daily       No current facility-administered medications for this visit.        Past Medical History:   Diagnosis Date    Allergic rhinitis due to pollen 01/20/2016    Basal cell carcinoma of left side of nose 08/19/2019    Cerebrovascular accident ([facility] [address] 07/16/2015    s/p TPA - residual numbness on R,    Cervical radiculopathy 05/23/2014    Chicken pox     Degenerative joint disease of knee 12/12/2013    Hyperlipidemia     Hypertension 12/13/2018    NAFLD (nonalcoholic fatty liver disease) 03/08/2019    RITESH on CPAP     [facility] [place]  Osteoarthritis of cervical spine 01/20/2016    Plantar fasciitis of right foot 10/19/2015    Primary osteoarthritis of both knees 01/20/2016    Right sided sciatica      Past Surgical History:   Procedure Laterality Date    CERVICAL FUSION  2012    [facility]- Dr. Maxi Valderrama  05/31/2018     LAPAROSCOPIC, CONVERTED TO OPEN RIGHT COLECTOMY    COLONOSCOPY N/A 07/03/2019    COLONOSCOPY, POSSIBLE POLYPECTOMY WITH MAC performed by Tawanda Mcmanus MD at [address] ARTHROSCOPY Right 12/26/2013    MOHS SURGERY  08/19/2019    L nasal tip    TOTAL KNEE ARTHROPLASTY Right 2019    Dr. Tapan Heath     Family History   Problem Relation Age of Onset    Substance Abuse Father     Cancer Maternal Grandmother         pancreas     Social History     Tobacco Use    Smoking status: Former Smoker     Packs/day: 0.01     Types: Cigarettes     Start date: 12/19/2012    Smokeless tobacco: Former User     Quit date: 8/29/2012   Vaping Use    Vaping Use: Never used   Substance Use Topics    Alcohol use: Yes     Alcohol/week: 14.0 - 21.0 standard drinks     Types: 14 - 21 Glasses of wine per week     Comment: 2-3 glasses of wine everynight,     Drug use: No     Comment: Medical Marijuana - Card for pain       Objective   /76   Pulse 72   Ht 5' 9.5\" (1.765 m)   Wt 242 lb 12.8 oz (110.1 kg)   BMI 35.34 kg/m²   Wt Readings from Last 3 Encounters:   05/25/21 242 lb 12.8 oz (110.1 kg)   08/19/19 256 lb (116.1 kg)   07/03/19 246 lb (111.6 kg)       Physical Exam  Constitutional:       Appearance: He is well-developed. HENT:      Head: Normocephalic and atraumatic. Nose: Nose normal.      Mouth/Throat:      Pharynx: No oropharyngeal exudate. Eyes:      General: No scleral icterus. Right eye: No discharge. Left eye: No discharge. Pupils: Pupils are equal, round, and reactive to light. Neck:      Thyroid: No thyromegaly.    Cardiovascular:      Rate and Rhythm: Normal rate and regular rhythm. Pulses:           Dorsalis pedis pulses are 2+ on the right side and 2+ on the left side. Posterior tibial pulses are 2+ on the right side and 2+ on the left side. Heart sounds: Normal heart sounds. No murmur heard. No friction rub. No gallop. Comments: No Edema Lower Extremities  Pulmonary:      Effort: Pulmonary effort is normal.      Breath sounds: Normal breath sounds. No wheezing or rales. Abdominal:      General: Bowel sounds are normal. There is no distension. Palpations: Abdomen is soft. There is no hepatomegaly or splenomegaly. Tenderness: There is no abdominal tenderness. There is no guarding or rebound. Musculoskeletal:         General: No tenderness or deformity. Normal range of motion. Cervical back: Normal range of motion and neck supple. Comments: Pain with supination against resistance at the lateral condyle of left elbow   Lymphadenopathy:      Cervical: No cervical adenopathy. Skin:     General: Skin is warm and dry. Findings: No erythema or rash. Neurological:      Mental Status: He is alert. Cranial Nerves: No cranial nerve deficit. Sensory: No sensory deficit.       Gait: Gait normal.      Comments: Positive reverse straight leg raise   Psychiatric:         Speech: Speech normal.         Behavior: Behavior normal.           Chemistry        Component Value Date/Time     (H) 11/23/2018 0724    K 4.4 11/23/2018 0724     11/23/2018 0724    CO2 26 11/23/2018 0724    BUN 9 11/23/2018 0724    CREATININE 0.9 11/23/2018 0724        Component Value Date/Time    CALCIUM 8.9 11/23/2018 0724    ALKPHOS 55 11/23/2018 0724    AST 48 (H) 11/23/2018 0724     (H) 11/23/2018 0724    BILITOT <0.2 11/23/2018 0724          Lab Results   Component Value Date    WBC 6.4 11/23/2018    HGB 14.8 11/23/2018    HCT 43.4 11/23/2018    MCV 90.7 11/23/2018     11/23/2018     Lab Results   Component Value Date    LABA1C 5.6 06/06/2018     Lab Results   Component Value Date    .0 06/06/2018     Lab Results   Component Value Date    LABA1C 5.6 06/06/2018     No components found for: CHLPL  Lab Results   Component Value Date    TRIG 235 (H) 11/23/2018    TRIG 171 (H) 06/06/2018    TRIG 142 01/17/2018     Lab Results   Component Value Date    HDL 49 11/23/2018    HDL 34 (L) 06/06/2018    HDL 59 01/17/2018     Lab Results   Component Value Date    LDLCALC 61 11/23/2018    LDLCALC 66 06/06/2018    LDLCALC 81 01/17/2018     Lab Results   Component Value Date    LABVLDL 47 11/23/2018    LABVLDL 34 06/06/2018    LABVLDL 28 01/17/2018         Assessment   Plan   1. Well adult exam   Appears well:  Counselled diet, development, anticipatory guidance and safety issues with patient and or parent(s). 2. Essential hypertension  Assessment & Plan:   Well-controlled, continue current medications and recheck 3 months. Encourage patient to bring in cuff to check calibration  Orders:  -     Comprehensive Metabolic Panel; Future  3. NAFLD (nonalcoholic fatty liver disease)  -     Comprehensive Metabolic Panel; Future  4. Vitamin D deficiency  Assessment & Plan:   Unclear control, continue current medications with over-the-counter supplementation. Check labs today  Orders:  -     Vitamin D 25 Hydroxy; Future  5. History of left tennis elbow  Assessment & Plan:   Uncontrolled, continue current medications and lifestyle modifications recommended  HEPIndia Will . recheck 3 months  6. Ischemic stroke [facility])  Assessment & Plan:   Well-controlled, continue current medications to optimize secondary prevention     Naresh Ta received counseling on the following healthy behaviors: nutrition and exercise    Patient given educational materials on Nutrition and Exercise      Discussed use, benefit, and side effects of prescribed medications. Barriers to medication compliance addressed. All patient questions answered. Pt voiced understanding.

## 2021-05-25 NOTE — ASSESSMENT & PLAN NOTE
Well-controlled, continue current medications and recheck 3 months.   Encourage patient to bring in cuff to check calibration

## 2021-05-25 NOTE — PATIENT INSTRUCTIONS
Examine your lifestyle and the barriers to bad and good habits and how you can design your life to make better choices    If you want to feel better these are the FUNDAMENTAL PILLARS of Wellness:    Make it EASY to do the RIGHT THINGS. 1)  You can choose to Get 150 min/week of moderate exercise (can talk but can't sing) or 75 min/week of vigorous exercise (can't talk)   This will enhance your sense of well being (Exercise is as good as medicine for depression.)    2)  You can choose to Get 7-9 hours of sleep per night    Detoxifies your brain, reduces risk of dementia    3)  You can choose to Strength Train 2 x a week on non-consecutive days   This will improve function and reduce risk of injury. Body weight type exercises such as Yoga and Pilates are good    4)  You can choose good nutrition. Only eat your goal weight (in lbs) x 10 calories/day and get 5 servings of Vegetables/day   Plant based diets reduce risk of heart attack/stroke and will help you feel full on less food. Avoid highly processed foods and processed carbohydrates. 5)  You can choose moderate alcohol intake < 1-2 drinks/day   Alcohol will disrupt your sleep and add calories to your day    6)  You can choose to develop a Charismatic/Supportive relationship. This will strengthen your resilience for the ups and downs. 7)  You can choose to Practice Mindfulness. An hour a day of prayer/meditation/gratitude will change your life! If you are trying to lose weight, here are some recommendations for weight loss:  Not every weight loss program is appropriate for everybody. ..  good online sources include Noom (more social with daily check ins), Lifesum (similar but less social) and Naturally slim, as well as Brandneu ($1500)    The GI Diet or \"Primal diet\", Intermittent fasting can also be effective choices. If you have diabetes treated with insulin be sure to ask me for specific guidance around meals.     Take your desired weight in pounds and multiply by 10 and that is your average daily calorie allowance. For example if you wish to weigh 170 lb x 10 = 1700 papito/day (this is how to gradually lose the weight and maintain your desired weight). Avoid soda/coke and all \"wet carbs\" => Drink ice water instead    Drink a large glass of ice water before meals and EAT SLOWLY (talk while you eat)! Rethink your hunger => it means your losing weight. Minimize highly processed carbohydrates as they stimulate your appetite:  Specifically cut back on Bread, Rice, Pasta and Potatoes    Avoid eating calories after 6 pm      Patient Education        Tennis Elbow: Exercises  Introduction  Here are some examples of exercises for you to try. The exercises may be suggested for a condition or for rehabilitation. Start each exercise slowly. Ease off the exercises if you start to have pain. You will be told when to start these exercises and which ones will work best for you. How to do the exercises  Wrist flexor stretch   1. Extend your arm in front of you with your palm up. 2. Bend your wrist, pointing your hand toward the floor. 3. With your other hand, gently bend your wrist farther until you feel a mild to moderate stretch in your forearm. 4. Hold for at least 15 to 30 seconds. Repeat 2 to 4 times. Wrist extensor stretch   1. Repeat steps 1 to 4 of the stretch above but begin with your extended hand palm down. Ball or sock squeeze   1. Hold a tennis ball (or a rolled-up sock) in your hand. 2. Make a fist around the ball (or sock) and squeeze. 3. Hold for about 6 seconds, and then relax for up to 10 seconds. 4. Repeat 8 to 12 times. 5. Switch the ball (or sock) to your other hand and do 8 to 12 times. Wrist deviation   1. Sit so that your arm is supported but your hand hangs off the edge of a flat surface, such as a table. 2. Hold your hand out like you are shaking hands with someone. 3. Move your hand up and down.   4. Repeat this motion 8 to 12 times. 5. Switch arms. 6. Try to do this exercise twice with each hand. Wrist curls   1. Place your forearm on a table with your hand hanging over the edge of the table, palm up. 2. Place a 1- to 2-pound weight in your hand. This may be a dumbbell, a can of food, or a filled water bottle. 3. Slowly raise and lower the weight while keeping your forearm on the table and your palm facing up. 4. Repeat this motion 8 to 12 times. 5. Switch arms, and do steps 1 through 4.  6. Repeat with your hand facing down toward the floor. Switch arms. Biceps curls   1. Sit leaning forward with your legs slightly spread and your left hand on your left thigh. 2. Place your right elbow on your right thigh, and hold the weight with your forearm horizontal.  3. Slowly curl the weight up and toward your chest.  4. Repeat this motion 8 to 12 times. 5. Switch arms, and do steps 1 through 4. Follow-up care is a key part of your treatment and safety. Be sure to make and go to all appointments, and call your doctor if you are having problems. It's also a good idea to know your test results and keep a list of the medicines you take. Where can you learn more? Go to https://Delight.[employer]. org and sign in to your [employer] account. Enter C227 in the [facility] box to learn more about \"Tennis Elbow: Exercises. \"     If you do not have an account, please click on the \"Sign Up Now\" link. Current as of: November 16, 2020               Content Version: 12.8  © 0222-3483 Healthwise, Incorporated. Care instructions adapted under license by [facility] ([facility]). If you have questions about a medical condition or this instruction, always ask your healthcare professional. [place] [address] any warranty or liability for your use of this information. Patient Education        Tennis Elbow: Exercises  Introduction  Here are some examples of exercises for you to try.  The exercises may be suggested for a condition or for rehabilitation. Start each exercise slowly. Ease off the exercises if you start to have pain. You will be told when to start these exercises and which ones will work best for you. How to do the exercises  Wrist flexor stretch   5. Extend your arm in front of you with your palm up. 6. Bend your wrist, pointing your hand toward the floor. 7. With your other hand, gently bend your wrist farther until you feel a mild to moderate stretch in your forearm. 8. Hold for at least 15 to 30 seconds. Repeat 2 to 4 times. Wrist extensor stretch   2. Repeat steps 1 to 4 of the stretch above but begin with your extended hand palm down. Ball or sock squeeze   6. Hold a tennis ball (or a rolled-up sock) in your hand. 7. Make a fist around the ball (or sock) and squeeze. 8. Hold for about 6 seconds, and then relax for up to 10 seconds. 9. Repeat 8 to 12 times. 10. Switch the ball (or sock) to your other hand and do 8 to 12 times. Wrist deviation   7. Sit so that your arm is supported but your hand hangs off the edge of a flat surface, such as a table. 8. Hold your hand out like you are shaking hands with someone. 9. Move your hand up and down. 10. Repeat this motion 8 to 12 times. 11. Switch arms. 12. Try to do this exercise twice with each hand. Wrist curls   7. Place your forearm on a table with your hand hanging over the edge of the table, palm up. 8. Place a 1- to 2-pound weight in your hand. This may be a dumbbell, a can of food, or a filled water bottle. 9. Slowly raise and lower the weight while keeping your forearm on the table and your palm facing up. 10. Repeat this motion 8 to 12 times. 11. Switch arms, and do steps 1 through 4.  12. Repeat with your hand facing down toward the floor. Switch arms. Biceps curls   6. Sit leaning forward with your legs slightly spread and your left hand on your left thigh.   7. Place your right elbow on your right thigh, and hold the weight with your forearm horizontal.  8. Slowly curl the weight up and toward your chest.  9. Repeat this motion 8 to 12 times. 10. Switch arms, and do steps 1 through 4. Follow-up care is a key part of your treatment and safety. Be sure to make and go to all appointments, and call your doctor if you are having problems. It's also a good idea to know your test results and keep a list of the medicines you take. Where can you learn more? Go to https://[employer]. org and sign in to your [employer] account. Enter D208 in the [employer] box to learn more about \"Tennis Elbow: Exercises. \"     If you do not have an account, please click on the \"Sign Up Now\" link. Current as of: November 16, 2020               Content Version: 12.8  © 2006-2021 Healthwise, Incorporated. Care instructions adapted under license by [facility] ([facility]). If you have questions about a medical condition or this instruction, always ask your healthcare professional. [address] any warranty or liability for your use of this information.

## 2021-05-25 NOTE — ASSESSMENT & PLAN NOTE
Unclear control, continue current medications with over-the-counter supplementation.   Check labs today

## 2021-06-02 ENCOUNTER — CLINICAL DOCUMENTATION (OUTPATIENT)
Dept: OTHER | Age: 51
End: 2021-06-02

## 2021-06-17 ENCOUNTER — PATIENT MESSAGE (OUTPATIENT)
Dept: PRIMARY CARE CLINIC | Age: 51
End: 2021-06-17

## 2021-06-17 DIAGNOSIS — G47.00 INSOMNIA, UNSPECIFIED TYPE: Primary | ICD-10-CM

## 2021-06-18 RX ORDER — ESZOPICLONE 2 MG/1
2 TABLET, FILM COATED ORAL NIGHTLY
Qty: 30 TABLET | Refills: 5 | Status: SHIPPED | OUTPATIENT
Start: 2021-06-18 | End: 2021-12-21 | Stop reason: SDUPTHER

## 2021-06-18 NOTE — TELEPHONE ENCOUNTER
Controlled Substance Monitoring:    Acute and Chronic Pain Monitoring:   RX Monitoring 1/17/2018   Attestation The Prescription Monitoring Report for this patient was reviewed today.

## 2021-08-26 ENCOUNTER — OFFICE VISIT (OUTPATIENT)
Dept: PRIMARY CARE CLINIC | Age: 51
End: 2021-08-26
Payer: COMMERCIAL

## 2021-08-26 VITALS
WEIGHT: 238 LBS | BODY MASS INDEX: 34.64 KG/M2 | HEART RATE: 76 BPM | DIASTOLIC BLOOD PRESSURE: 84 MMHG | SYSTOLIC BLOOD PRESSURE: 124 MMHG

## 2021-08-26 DIAGNOSIS — M54.31 SCIATICA OF RIGHT SIDE: ICD-10-CM

## 2021-08-26 DIAGNOSIS — I63.9 ISCHEMIC STROKE (HCC): ICD-10-CM

## 2021-08-26 DIAGNOSIS — K76.0 NAFLD (NONALCOHOLIC FATTY LIVER DISEASE): ICD-10-CM

## 2021-08-26 DIAGNOSIS — I10 ESSENTIAL HYPERTENSION: Primary | ICD-10-CM

## 2021-08-26 PROCEDURE — 99214 OFFICE O/P EST MOD 30 MIN: CPT | Performed by: FAMILY MEDICINE

## 2021-08-26 RX ORDER — HYDROCODONE BITARTRATE AND ACETAMINOPHEN 5; 325 MG/1; MG/1
TABLET ORAL
COMMUNITY
Start: 2021-08-10 | End: 2021-12-22 | Stop reason: ALTCHOICE

## 2021-08-26 NOTE — PROGRESS NOTES
Chief Complaint   Patient presents with    Hypertension    Hyperlipidemia    Obesity       HPI: Jermaine Greco presents for evaluation and management of hypertension, cholesterol, obesity and right-sided leg pain. Jermaine Greco notes that he is taking and tolerating his blood pressure medicine denies lightheadedness or dizziness. He notes he is taking tolerating his Lipitor for hisprior history of stroke. Notes he does not have any muscle aches or stomach pain. He has been working on getting more fit. He and his partner have been going to the gym. He notes Amor Taylor is working alongside of him. They are having trouble getting give him 4 times a week as they wanted to do but they are still going 1-2 times a week. He reports a 6-year history of right-sided leg pain radiating down from his back into the sole of his foot and the top of his foot. It is associated with some dysesthesia but it feels remarkably similar to the pain he was having in his arm when he had cervical radiculitis. He had previously been seen by Dr. Adriel Pavon at [facility] and would like a referral back to that clinic. Review of Systems    No Known Allergies  New Prescriptions    No medications on file     Current Outpatient Medications   Medication Sig Dispense Refill    eszopiclone (LUNESTA) 2 MG TABS Take 1 tablet by mouth nightly.  30 tablet 5    amLODIPine-benazepril (LOTREL) 10-20 MG per capsule       vitamin D 25 MCG (1000 UT) CAPS Take 1,000 Units by mouth daily      Barberry-Oreg Grape-Goldenseal (BERBERINE COMPLEX) 200-200-50 MG CAPS Take by mouth daily      atorvastatin (LIPITOR) 40 MG tablet Take 0.5 tablets by mouth daily 45 tablet 3    hydrochlorothiazide (HYDRODIURIL) 25 MG tablet Take 1 tablet by mouth daily 90 tablet 2    aspirin 81 MG tablet Take 81 mg by mouth daily      HYDROcodone-acetaminophen (NORCO) 5-325 MG per tablet  (Patient not taking: Reported on 8/26/2021)       No current facility-administered medications for this visit. Past Medical History:   Diagnosis Date    Allergic rhinitis due to pollen 01/20/2016    Basal cell carcinoma of left side of nose 08/19/2019    Cerebrovascular accident ([address] 07/16/2015    s/p TPA - residual numbness on R,    Cervical radiculopathy 05/23/2014    Chicken pox     Degenerative joint disease of knee 12/12/2013    Hyperlipidemia     Hypertension 12/13/2018    NAFLD (nonalcoholic fatty liver disease) 03/08/2019    RITESH on CPAP     Sleep [facility] [place]    Osteoarthritis of cervical spine 01/20/2016    Plantar fasciitis of right foot 10/19/2015    Primary osteoarthritis of both knees 01/20/2016    Right sided sciatica          Objective   /84   Pulse 76   Wt 238 lb (108 kg)   BMI 34.64 kg/m²   Wt Readings from Last 3 Encounters:   08/26/21 238 lb (108 kg)   05/25/21 242 lb 12.8 oz (110.1 kg)   08/19/19 256 lb (116.1 kg)       Physical Exam  Constitutional:       Appearance: He is well-developed. Cardiovascular:      Rate and Rhythm: Normal rate and regular rhythm. Heart sounds: No murmur heard. No friction rub. No gallop. Pulmonary:      Effort: Pulmonary effort is normal.      Breath sounds: Normal breath sounds. No wheezing or rales. Abdominal:      General: Bowel sounds are normal. There is no distension. Palpations: Abdomen is soft. There is no mass. Tenderness: There is no abdominal tenderness. Musculoskeletal:      Comments: Straight leg raise negative   Skin:     General: Skin is warm and dry. Findings: No rash.            Chemistry        Component Value Date/Time     05/25/2021 0925    K 3.7 05/25/2021 0925    CL 99 05/25/2021 0925    CO2 27 05/25/2021 0925    BUN 9 05/25/2021 0925    CREATININE 0.7 (L) 05/25/2021 0925        Component Value Date/Time    CALCIUM 9.6 05/25/2021 0925    ALKPHOS 96 05/25/2021 0925    AST 44 (H) 05/25/2021 0925    ALT 79 (H) 05/25/2021 0925    BILITOT 0.4 05/25/2021 0925          Lab Results   Component Value Date    WBC 6.4 11/23/2018    HGB 14.8 11/23/2018    HCT 43.4 11/23/2018    MCV 90.7 11/23/2018     11/23/2018     Lab Results   Component Value Date    LABA1C 5.6 06/06/2018     Lab Results   Component Value Date    .0 06/06/2018     Lab Results   Component Value Date    LABA1C 5.6 06/06/2018     No components found for: CHLPL  Lab Results   Component Value Date    TRIG 203 (H) 05/25/2021    TRIG 235 (H) 11/23/2018    TRIG 171 (H) 06/06/2018     Lab Results   Component Value Date    HDL 67 (H) 05/25/2021    HDL 49 11/23/2018    HDL 34 (L) 06/06/2018     Lab Results   Component Value Date    LDLCALC 65 05/25/2021    LDLCALC 61 11/23/2018    LDLCALC 66 06/06/2018     Lab Results   Component Value Date    LABVLDL 41 05/25/2021    LABVLDL 47 11/23/2018    LABVLDL 34 06/06/2018     MRI Lumbar Spine 2/4/16     CONCLUSION:   1. Dominant finding is a nominal noncompressive left paracentral disc displacement with annular    rent L5-S1. No central canal stenosis, foraminal stenosis or nerve root compression. Moderate    left and severe right facet arthropathy with capsulosynovitis with ongoing sterile arthropathic    bony stress reaction noted involving the right S1 pedicle and superior articular facet. No    spondylolysis or listhesis. Incidentally noted are noncompressive synovial cysts anterolateral    to the facet joints and posterior to the L5 nerve roots without nerve root compression. 2. Shallow broad based disc displacement L3-4 with superimposed left foraminal and    extraforaminal protrusion with annular rent and facet arthropathy mildly narrows the left    neural foramen and abuts the exiting left L3 nerve root which is opposite the side of clinical    interest.   3. Shallow broad based disc displacement L1-2 minimally narrows the neural foramina and abuts    the descending L2 nerve roots bilaterally. Assessment   Plan   1. Essential hypertension   Controlled: Appears stable. We will continue current management and monitor for adverse reaction and disease progression. Follow-up as noted below    2. NAFLD (nonalcoholic fatty liver disease)   Praised efforts at weight loss. We will continue to monitor and recheck in 3 months with labs at that time      3. Ischemic stroke ([address]   Continue meds to reduce risk of recurrence. Return to clinic in 3 months  4. Sciatica of right side  Differential diagnosis also includes reflex sympathetic dystrophy. We will refer to Dr. Meri Hernandez for definitive evaluation and management given findings of herniated disc on previous MRI.  -     RUDDY Bañuelos MD, Neurosurgery, [facility]     Discussed use, benefit, and side effects of prescribed medications. Barriers to medication compliance addressed. All patient questions answered. Pt voiced understanding. RTC Return in about 3 months (around 11/26/2021).

## 2021-08-26 NOTE — PATIENT INSTRUCTIONS
[address] ROSE Qureshi MD  [facility] [address] [facility], [facility] Marcia S[place]  Ph: [phone] Roshni Cruz MD  [facility], [facility] [place]  Ph: [phone]

## 2021-09-18 ENCOUNTER — PATIENT MESSAGE (OUTPATIENT)
Dept: PRIMARY CARE CLINIC | Age: 51
End: 2021-09-18

## 2021-09-18 DIAGNOSIS — I10 ESSENTIAL HYPERTENSION: ICD-10-CM

## 2021-09-20 ENCOUNTER — TELEPHONE (OUTPATIENT)
Dept: PRIMARY CARE CLINIC | Age: 51
End: 2021-09-20

## 2021-09-20 RX ORDER — HYDROCHLOROTHIAZIDE 25 MG/1
25 TABLET ORAL DAILY
Qty: 90 TABLET | Refills: 2 | Status: SHIPPED | OUTPATIENT
Start: 2021-09-20

## 2021-09-20 NOTE — TELEPHONE ENCOUNTER
From: Gretchen Gaucher  To: Yola Kirk MD  Sent: 9/18/2021 10:13 AM EDT  Subject: Prescription Question    Hi Dr. Keagan Sanabria. ...could you call into [address] for a refill of my Hydrochlorothizide? I'm out and no refills. Would like a 90 day refill please. Thanks!   Facundo Fakmirian

## 2021-09-20 NOTE — TELEPHONE ENCOUNTER
----- Message from [address] [facility] sent at 9/20/2021 12:40 PM EDT -----  Subject: Refill Request    QUESTIONS  Name of Medication? hydroCHLOROthiazide (HYDRODIURIL) 25 MG tablet  Patient-reported dosage and instructions? 25mg,1 po qd  How many days do you have left? 2  Preferred Pharmacy? [address]  phone number (if available)? [phone]  Additional Information for Provider? pt wants a 90 day supply  ---------------------------------------------------------------------------  --------------  CALL BACK INFO  What is the best way for the office to contact you? OK to leave message on   voicemail  Preferred Call Back Phone Number?  [phone]

## 2021-09-20 NOTE — TELEPHONE ENCOUNTER
Medication:   Requested Prescriptions     Pending Prescriptions Disp Refills    hydroCHLOROthiazide (HYDRODIURIL) 25 MG tablet 90 tablet 2     Sig: Take 1 tablet by mouth daily        Last Filled:  11/18/2018    Patient Phone Number: [phone] (home) [phone] (work)    Last appt: 8/26/2021 RTC Return in about 3 months (around 11/26/2021). Next appt: Visit date not found    Last OARRS:   RX Monitoring 1/17/2018   Attestation The Prescription Monitoring Report for this patient was reviewed today. Last PDMP Raudel Arrington as Reviewed:    Review User Review Instant Review Result   Lee Wells 6/18/2021 2:37 PM Reviewed PDMP [1]        Last Controlled Substance Monitoring Documentation     Office Visit from 1/17/2018 in [facility] The Prescription Monitoring Report for this patient was reviewed today.  filed at 01/17/2018 0008

## 2021-09-20 NOTE — TELEPHONE ENCOUNTER
This writer call the pt to schedule his 3 month follow up due to the pt is requesting refills and does not have follow up appointment. He was last seen 8/26/21.

## 2021-09-21 ENCOUNTER — TELEPHONE (OUTPATIENT)
Dept: PRIMARY CARE CLINIC | Age: 51
End: 2021-09-21

## 2021-09-21 NOTE — TELEPHONE ENCOUNTER
----- Message from Vera Salvador sent at 9/21/2021 10:41 AM EDT -----  Subject: Message to Provider    QUESTIONS  Information for Provider? Patient is calling in about medication   [(Hydrodiuril  25 mg patient called i to get refilled , he has not heard   anything back so he was wondering if some one could reach out in this   regards he needs this medication he is now out of it . Please reach out to   patient in this regards   ---------------------------------------------------------------------------  --------------  [address]  What is the best way for the office to contact you? OK to leave message on   voicemail  Preferred Call Back Phone Number? [phone]  ---------------------------------------------------------------------------  --------------  SCRIPT ANSWERS  Relationship to Patient?  Self

## 2021-09-21 NOTE — TELEPHONE ENCOUNTER
Medication was sent in to the pharmacy on 9/20/2021. Left voice message that patient should be able to  medication.

## 2021-12-21 ENCOUNTER — PATIENT MESSAGE (OUTPATIENT)
Dept: PRIMARY CARE CLINIC | Age: 51
End: 2021-12-21

## 2021-12-21 DIAGNOSIS — G47.00 INSOMNIA, UNSPECIFIED TYPE: ICD-10-CM

## 2021-12-21 RX ORDER — ESZOPICLONE 2 MG/1
2 TABLET, FILM COATED ORAL NIGHTLY
Qty: 30 TABLET | Refills: 0 | Status: SHIPPED | OUTPATIENT
Start: 2021-12-21 | End: 2022-02-07 | Stop reason: SDUPTHER

## 2021-12-21 NOTE — TELEPHONE ENCOUNTER
From: Chelsey Pate  To: Dr. Layo Michel: 12/21/2021 9:08 AM EST  Subject: Prescription Question    Hi  [facility]. Vincent Elizabeth I need a refill for my Eszopiclone. Looks like my refill ran out on 12/17. Could you please call that into [facility] on [address]?       Thank you   Prakash

## 2021-12-21 NOTE — TELEPHONE ENCOUNTER
Farrah made for 12/22 pt did not understand why he needed to come in states you all never discussed him coming in every three months he said he has no problems and nothing going on I let him know it is for his blood pressure

## 2021-12-21 NOTE — TELEPHONE ENCOUNTER
Medication:   Requested Prescriptions     Pending Prescriptions Disp Refills    eszopiclone (LUNESTA) 2 MG TABS 30 tablet 5     Sig: Take 1 tablet by mouth nightly. Last Filled:  06/18/21    Patient Phone Number: [phone] (home) [phone] (work)    Last appt: 8/26/2021 Return in about 3 months (around 11/26/2021). Next appt: Visit date not found    Last OARRS:   RX Monitoring 1/17/2018   Attestation The Prescription Monitoring Report for this patient was reviewed today.

## 2021-12-22 ENCOUNTER — OFFICE VISIT (OUTPATIENT)
Dept: PRIMARY CARE CLINIC | Age: 51
End: 2021-12-22
Payer: COMMERCIAL

## 2021-12-22 VITALS
TEMPERATURE: 97.9 F | HEART RATE: 72 BPM | BODY MASS INDEX: 35.55 KG/M2 | DIASTOLIC BLOOD PRESSURE: 84 MMHG | RESPIRATION RATE: 16 BRPM | WEIGHT: 244.2 LBS | SYSTOLIC BLOOD PRESSURE: 131 MMHG

## 2021-12-22 DIAGNOSIS — I10 ESSENTIAL HYPERTENSION: Primary | ICD-10-CM

## 2021-12-22 DIAGNOSIS — K76.0 NAFLD (NONALCOHOLIC FATTY LIVER DISEASE): ICD-10-CM

## 2021-12-22 DIAGNOSIS — G47.00 INSOMNIA, UNSPECIFIED TYPE: ICD-10-CM

## 2021-12-22 DIAGNOSIS — Z23 NEED FOR VACCINATION: ICD-10-CM

## 2021-12-22 DIAGNOSIS — I10 ESSENTIAL HYPERTENSION: ICD-10-CM

## 2021-12-22 DIAGNOSIS — E78.2 MIXED HYPERLIPIDEMIA: ICD-10-CM

## 2021-12-22 DIAGNOSIS — M54.31 SCIATICA OF RIGHT SIDE: ICD-10-CM

## 2021-12-22 LAB
A/G RATIO: 2 (ref 1.1–2.2)
ALBUMIN SERPL-MCNC: 4.4 G/DL (ref 3.4–5)
ALP BLD-CCNC: 85 U/L (ref 40–129)
ALT SERPL-CCNC: 74 U/L (ref 10–40)
ANION GAP SERPL CALCULATED.3IONS-SCNC: 13 MMOL/L (ref 3–16)
AST SERPL-CCNC: 39 U/L (ref 15–37)
BILIRUB SERPL-MCNC: 0.5 MG/DL (ref 0–1)
BUN BLDV-MCNC: 11 MG/DL (ref 7–20)
CALCIUM SERPL-MCNC: 9.5 MG/DL (ref 8.3–10.6)
CHLORIDE BLD-SCNC: 101 MMOL/L (ref 99–110)
CO2: 27 MMOL/L (ref 21–32)
CREAT SERPL-MCNC: 0.8 MG/DL (ref 0.9–1.3)
GFR AFRICAN AMERICAN: >60
GFR NON-AFRICAN AMERICAN: >60
GLUCOSE BLD-MCNC: 102 MG/DL (ref 70–99)
POTASSIUM SERPL-SCNC: 4.1 MMOL/L (ref 3.5–5.1)
SODIUM BLD-SCNC: 141 MMOL/L (ref 136–145)
TOTAL PROTEIN: 6.6 G/DL (ref 6.4–8.2)

## 2021-12-22 PROCEDURE — 99214 OFFICE O/P EST MOD 30 MIN: CPT | Performed by: FAMILY MEDICINE

## 2021-12-22 RX ORDER — AMLODIPINE BESYLATE AND BENAZEPRIL HYDROCHLORIDE 10; 20 MG/1; MG/1
1 CAPSULE ORAL DAILY
Qty: 90 CAPSULE | Refills: 3 | Status: SHIPPED | OUTPATIENT
Start: 2021-12-22

## 2021-12-22 RX ORDER — ATORVASTATIN CALCIUM 40 MG/1
20 TABLET, FILM COATED ORAL DAILY
Qty: 45 TABLET | Refills: 3 | Status: SHIPPED | OUTPATIENT
Start: 2021-12-22

## 2021-12-22 ASSESSMENT — PATIENT HEALTH QUESTIONNAIRE - PHQ9
2. FEELING DOWN, DEPRESSED OR HOPELESS: 0
SUM OF ALL RESPONSES TO PHQ QUESTIONS 1-9: 0
SUM OF ALL RESPONSES TO PHQ QUESTIONS 1-9: 0
1. LITTLE INTEREST OR PLEASURE IN DOING THINGS: 0
SUM OF ALL RESPONSES TO PHQ QUESTIONS 1-9: 0
SUM OF ALL RESPONSES TO PHQ9 QUESTIONS 1 & 2: 0

## 2021-12-22 NOTE — PROGRESS NOTES
Chief Complaint   Patient presents with    3 Month Follow-Up    Hypertension    Back Pain       HPI: Kris Raza  presents for evaluation and management of hypertension, sciatica and insomnia. He notes he has been feeling well. He does not check his blood pressures very often but he notes no lightheadedness or dizziness. He notes his insomnia is pretty well controlled with melatonin. He takes an occasional Lunesta. Approximately 2-3 a month as the melatonin seems to wear off in the early morning around 3 AM.    He notes he continues with intermittent right-sided sciatica. Began working out the gym more about 3 weeks ago and noted that pain had increased after doing so. He has been doing home exercise program and pain is decreasing a little bit    Today's PHQ:    PHQ Scores 12/22/2021 5/25/2021 1/17/2018 9/25/2017   PHQ2 Score 0 0 3 0   PHQ9 Score 0 0 9 0     Interpretation of Total Score Depression Severity: 1-4 = Minimal depression, 5-9 = Mild depression, 10-14 = Moderate depression, 15-19 = Moderately severe depression, 20-27 = Severe depression        Review of Systems    No Known Allergies  New Prescriptions    No medications on file     Current Outpatient Medications   Medication Sig Dispense Refill    eszopiclone (LUNESTA) 2 MG TABS Take 1 tablet by mouth nightly. 30 tablet 0    hydroCHLOROthiazide (HYDRODIURIL) 25 MG tablet Take 1 tablet by mouth daily 90 tablet 2    amLODIPine-benazepril (LOTREL) 10-20 MG per capsule       vitamin D 25 MCG (1000 UT) CAPS Take 1,000 Units by mouth daily      Barberry-Oreg Grape-Goldenseal (BERBERINE COMPLEX) 200-200-50 MG CAPS Take by mouth daily      atorvastatin (LIPITOR) 40 MG tablet Take 0.5 tablets by mouth daily 45 tablet 3    aspirin 81 MG tablet Take 81 mg by mouth daily       No current facility-administered medications for this visit.        Past Medical History:   Diagnosis Date    Allergic rhinitis due to pollen 01/20/2016    Basal cell carcinoma of  11/23/2018     Lab Results   Component Value Date    LABA1C 5.6 06/06/2018     Lab Results   Component Value Date    .0 06/06/2018     Lab Results   Component Value Date    LABA1C 5.6 06/06/2018     No components found for: CHLPL  Lab Results   Component Value Date    TRIG 203 (H) 05/25/2021    TRIG 235 (H) 11/23/2018    TRIG 171 (H) 06/06/2018     Lab Results   Component Value Date    HDL 67 (H) 05/25/2021    HDL 49 11/23/2018    HDL 34 (L) 06/06/2018     Lab Results   Component Value Date    LDLCALC 65 05/25/2021    LDLCALC 61 11/23/2018    LDLCALC 66 06/06/2018     Lab Results   Component Value Date    LABVLDL 41 05/25/2021    LABVLDL 47 11/23/2018    LABVLDL 34 06/06/2018         Assessment   Plan   1. Essential hypertension  Controlled: Appears stable. We will continue current management and monitor for adverse reaction and disease progression. Follow-up as noted below    - Comprehensive Metabolic Panel; Future  - amLODIPine-benazepril (LOTREL) 10-20 MG per capsule; Take 1 capsule by mouth daily  Dispense: 90 capsule; Refill: 3    2. Sciatica of right side  Chronic and persistent: Patient declines intervention today. We will monitor and if worsening may need referral    3. Insomnia, unspecified type  Controlled: Appears stable. We will continue current management and monitor for adverse reaction and disease progression. Follow-up as noted below      4. NAFLD (nonalcoholic fatty liver disease)  Counseled patient on a low-carb diet and to continue exercising. We will check labs today  - Comprehensive Metabolic Panel; Future    5. Need for vaccination  Already had his flu shot this year and is up-to-date on Covid vaccination    6. Mixed hyperlipidemia  Treating: Continue meds and follow-up in 6 months with labs at that time  - atorvastatin (LIPITOR) 40 MG tablet; Take 0.5 tablets by mouth daily  Dispense: 45 tablet; Refill: 3      .      Discussed use, benefit, and side effects of prescribed medications. Barriers to medication compliance addressed. All patient questions answered. Pt voiced understanding. RTC Return in about 6 months (around 6/22/2022).

## 2021-12-28 ENCOUNTER — TELEPHONE (OUTPATIENT)
Dept: PRIMARY CARE CLINIC | Age: 51
End: 2021-12-28

## 2021-12-28 DIAGNOSIS — C44.311 BASAL CELL CARCINOMA OF LEFT SIDE OF NOSE: Primary | ICD-10-CM

## 2021-12-28 NOTE — TELEPHONE ENCOUNTER
----- Message from Sofy sent at 12/28/2021  2:01 PM EST -----  Subject: Message to Provider    QUESTIONS  Information for Provider? dermatologist referral needed  ---------------------------------------------------------------------------  --------------  [address]  What is the best way for the office to contact you? OK to leave message on   voicemail  Preferred Call Back Phone Number? [phone]  ---------------------------------------------------------------------------  --------------  SCRIPT ANSWERS  Relationship to Patient?  Self

## 2021-12-28 NOTE — TELEPHONE ENCOUNTER
For cancer on his nose he had surgery has to see derm every 3-6 months was seeing one in [place] [address] he ai like one closer to home.  He stated he picked up one of elkeb cards his last visit he is fine with seeing her

## 2022-01-24 ENCOUNTER — TELEPHONE (OUTPATIENT)
Dept: SURGERY | Age: 52
End: 2022-01-24

## 2022-01-24 ENCOUNTER — OFFICE VISIT (OUTPATIENT)
Dept: PRIMARY CARE CLINIC | Age: 52
End: 2022-01-24
Payer: COMMERCIAL

## 2022-01-24 VITALS
HEART RATE: 69 BPM | WEIGHT: 247.2 LBS | HEIGHT: 70 IN | SYSTOLIC BLOOD PRESSURE: 126 MMHG | DIASTOLIC BLOOD PRESSURE: 72 MMHG | TEMPERATURE: 97.2 F | BODY MASS INDEX: 35.39 KG/M2

## 2022-01-24 DIAGNOSIS — Z00.00 ENCOUNTER FOR WELL ADULT EXAM WITHOUT ABNORMAL FINDINGS: ICD-10-CM

## 2022-01-24 DIAGNOSIS — I10 ESSENTIAL HYPERTENSION: ICD-10-CM

## 2022-01-24 DIAGNOSIS — K76.0 NAFLD (NONALCOHOLIC FATTY LIVER DISEASE): ICD-10-CM

## 2022-01-24 DIAGNOSIS — Z13.1 SCREENING FOR DIABETES MELLITUS (DM): ICD-10-CM

## 2022-01-24 DIAGNOSIS — Z00.00 WELL ADULT EXAM: Primary | ICD-10-CM

## 2022-01-24 DIAGNOSIS — M54.31 SCIATICA OF RIGHT SIDE: ICD-10-CM

## 2022-01-24 PROCEDURE — 99396 PREV VISIT EST AGE 40-64: CPT | Performed by: FAMILY MEDICINE

## 2022-01-24 PROCEDURE — 99214 OFFICE O/P EST MOD 30 MIN: CPT | Performed by: FAMILY MEDICINE

## 2022-01-24 SDOH — ECONOMIC STABILITY: FOOD INSECURITY: WITHIN THE PAST 12 MONTHS, THE FOOD YOU BOUGHT JUST DIDN'T LAST AND YOU DIDN'T HAVE MONEY TO GET MORE.: NEVER TRUE

## 2022-01-24 SDOH — ECONOMIC STABILITY: FOOD INSECURITY: WITHIN THE PAST 12 MONTHS, YOU WORRIED THAT YOUR FOOD WOULD RUN OUT BEFORE YOU GOT MONEY TO BUY MORE.: NEVER TRUE

## 2022-01-24 ASSESSMENT — ENCOUNTER SYMPTOMS
SORE THROAT: 0
COUGH: 0
SHORTNESS OF BREATH: 0
COLOR CHANGE: 0
ABDOMINAL PAIN: 0
DIARRHEA: 0
VOMITING: 0
EYE PAIN: 0
RHINORRHEA: 0
CONSTIPATION: 0
NAUSEA: 0

## 2022-01-24 ASSESSMENT — SOCIAL DETERMINANTS OF HEALTH (SDOH): HOW HARD IS IT FOR YOU TO PAY FOR THE VERY BASICS LIKE FOOD, HOUSING, MEDICAL CARE, AND HEATING?: NOT HARD AT ALL

## 2022-01-24 NOTE — PATIENT INSTRUCTIONS
Well Visit, Men 48 to 72: Care Instructions  Overview     Well visits can help you stay healthy. Your doctor has checked your overall health and may have suggested ways to take good care of yourself. Your doctor also may have recommended tests. At home, you can help prevent illness with healthy eating, regular exercise, and other steps. Follow-up care is a key part of your treatment and safety. Be sure to make and go to all appointments, and call your doctor if you are having problems. It's also a good idea to know your test results and keep a list of the medicines you take. How can you care for yourself at home? · Get screening tests that you and your doctor decide on. Screening helps find diseases before any symptoms appear. · Eat healthy foods. Choose fruits, vegetables, whole grains, protein, and low-fat dairy foods. Limit fat, especially saturated fat. Reduce salt in your diet. · Limit alcohol. Have no more than 2 drinks a day or 14 drinks a week. · Get at least 30 minutes of exercise on most days of the week. Walking is a good choice. You also may want to do other activities, such as running, swimming, cycling, or playing tennis or team sports. · Reach and stay at a healthy weight. This will lower your risk for many problems, such as obesity, diabetes, heart disease, and high blood pressure. · Do not smoke. Smoking can make health problems worse. If you need help quitting, talk to your doctor about stop-smoking programs and medicines. These can increase your chances of quitting for good. · Care for your mental health. It is easy to get weighed down by worry and stress. Learn strategies to manage stress, like deep breathing and mindfulness, and stay connected with your family and community. If you find you often feel sad or hopeless, talk with your doctor. Treatment can help. · Talk to your doctor about whether you have any risk factors for sexually transmitted infections (STIs).  You can help prevent STIs if you wait to have sex with a new partner (or partners) until you've each been tested for STIs. It also helps if you use condoms (male or female condoms) and if you limit your sex partners to one person who only has sex with you. Vaccines are available for some STIs. · If it's important to you to prevent pregnancy with your partner, talk with your doctor about birth control options that might be best for you. · If you think you may have a problem with alcohol or drug use, talk to your doctor. This includes prescription medicines (such as amphetamines and opioids) and illegal drugs (such as cocaine and methamphetamine). Your doctor can help you figure out what type of treatment is best for you. · Protect your skin from too much sun. When you're outdoors from 10 a.m. to 4 p.m., stay in the shade or cover up with clothing and a hat with a wide brim. Wear sunglasses that block UV rays. Even when it's cloudy, put broad-spectrum sunscreen (SPF 30 or higher) on any exposed skin. · See a dentist one or two times a year for checkups and to have your teeth cleaned. · Wear a seat belt in the car. When should you call for help? Watch closely for changes in your health, and be sure to contact your doctor if you have any problems or symptoms that concern you. Where can you learn more? Go to https://chlissetteeb.[facility]. org and sign in to your [employer] account. Enter W777 in the [facility] box to learn more about \"Well Visit, Men 48 to 72: Care Instructions. \"     If you do not have an account, please click on the \"Sign Up Now\" link. Current as of: October 6, 2021               Content Version: 13.1  © 0807-8506 [employer], Incorporated. Care instructions adapted under license by [facility] ([facility]).  If you have questions about a medical condition or this instruction, always ask your healthcare professional. [address]  any warranty or liability for your use of this information.

## 2022-01-24 NOTE — PROGRESS NOTES
Well Adult Note  Name: Lawrence Kenney Date: 2022   MRN: <[id number]> Sex: Male   Age: 45 y.o. Ethnicity: Non- / Non    : 1970 Race: White (non-)      Alvin Mann is here for well adult exam.  History:  He notes he exercises by running on treadmill 45 minutes 4 times a week and lifting weights as well. He gets about 8 hours of sleep a night with CPAP and states he is doing well with that. He drinks about 2 alcoholic beverages during the week and more on weekends. He eats about 4 servings of vegetables a day and has good social support. He is taking and tolerating his blood pressure medicine without lightheadedness or dizziness. He is struggling with managing his weight. States he and his  eat a lot of fresh food but he has a hard time cutting out the carbohydrates. He notes no abdominal pain. Physically he is feeling pretty good. Has had no recurrence of stroke symptoms and is compliant with his aspirin      Review of Systems   Constitutional: Negative for chills and fever. HENT: Negative for ear pain, rhinorrhea and sore throat. Eyes: Negative for pain and visual disturbance. Respiratory: Negative for cough and shortness of breath. Cardiovascular: Negative for chest pain and palpitations. Gastrointestinal: Negative for abdominal pain, constipation, diarrhea, nausea and vomiting. Genitourinary: Negative for dysuria and frequency. Musculoskeletal: Negative for joint swelling and myalgias. Skin: Negative for color change and rash. Neurological: Negative for weakness, numbness and headaches. Hematological: Negative for adenopathy. Does not bruise/bleed easily. Psychiatric/Behavioral: Negative for dysphoric mood, self-injury and suicidal ideas. The patient is not nervous/anxious. No Known Allergies    Prior to Visit Medications    Medication Sig Taking?  Authorizing Provider   amLODIPine-benazepril (LOTREL) 10-20 MG per capsule Take 1 capsule by mouth daily Yes Robb Bright MD   atorvastatin (LIPITOR) 40 MG tablet Take 0.5 tablets by mouth daily Yes Robb Bright MD   eszopiclone (LUNESTA) 2 MG TABS Take 1 tablet by mouth nightly.  Yes Robb Bright MD   hydroCHLOROthiazide (HYDRODIURIL) 25 MG tablet Take 1 tablet by mouth daily Yes Robb Bright MD   vitamin D 25 MCG (1000 UT) CAPS Take 1,000 Units by mouth daily Yes Historical Provider, MD   Barberry-Oreg Grape-Goldenseal (BERBERINE COMPLEX) 273-848-56 MG CAPS Take by mouth daily Yes Historical Provider, MD   aspirin 81 MG tablet Take 81 mg by mouth daily Yes Historical Provider, MD       Past Medical History:   Diagnosis Date    Allergic rhinitis due to pollen 01/20/2016    Basal cell carcinoma of left side of nose 08/19/2019    Cerebrovascular accident ([facility] [address] 07/16/2015    s/p TPA - residual numbness on R,    Cervical radiculopathy 05/23/2014    Chicken pox     Degenerative joint disease of knee 12/12/2013    Hyperlipidemia     Hypertension 12/13/2018    NAFLD (nonalcoholic fatty liver disease) 03/08/2019    RITESH on CPAP     Sleep Management [place]    Osteoarthritis of cervical spine 01/20/2016    Plantar fasciitis of right foot 10/19/2015    Primary osteoarthritis of both knees 01/20/2016    Right sided sciatica        Past Surgical History:   Procedure Laterality Date    CERVICAL FUSION  2012    [place]- Dr. Birgit Cid  05/31/2018     LAPAROSCOPIC, CONVERTED TO OPEN RIGHT COLECTOMY    COLONOSCOPY N/A 07/03/2019    COLONOSCOPY, POSSIBLE POLYPECTOMY WITH MAC performed by Stephenie Duenas MD at [address] [place] [address] ARTHROSCOPY Right 12/26/2013    MOHS SURGERY  08/19/2019    L nasal tip    TOTAL KNEE ARTHROPLASTY Right 2019    Dr. Bryant Second       Family History   Problem Relation Age of Onset    Substance Abuse Father     Cancer Maternal Grandmother         pancreas       Social History     Tobacco Use    Smoking status: Former Smoker     Packs/day: 0.01     Start date: 12/19/2012    Smokeless tobacco: Former User     Quit date: 8/29/2012   Vaping Use    Vaping Use: Never used   Substance Use Topics    Alcohol use: Yes     Alcohol/week: 14.0 - 21.0 standard drinks     Types: 14 - 21 Glasses of wine per week     Comment: 2-3 glasses of wine everynight,     Drug use: No     Comment: Medical Marijuana - Card for pain       Objective   /72   Pulse 69   Temp 97.2 °F (36.2 °C) (Axillary)   Ht 5' 10\" (1.778 m)   Wt 247 lb 3.2 oz (112.1 kg)   BMI 35.47 kg/m²   Wt Readings from Last 3 Encounters:   01/24/22 247 lb 3.2 oz (112.1 kg)   12/22/21 244 lb 3.2 oz (110.8 kg)   08/26/21 238 lb (108 kg)     There were no vitals filed for this visit. Physical Exam  Constitutional:       Appearance: He is well-developed. HENT:      Head: Normocephalic and atraumatic. Nose: Nose normal.      Mouth/Throat:      Pharynx: No oropharyngeal exudate. Eyes:      General: No scleral icterus. Right eye: No discharge. Left eye: No discharge. Pupils: Pupils are equal, round, and reactive to light. Neck:      Thyroid: No thyromegaly. Cardiovascular:      Rate and Rhythm: Normal rate and regular rhythm. Pulses:           Dorsalis pedis pulses are 2+ on the right side and 2+ on the left side. Posterior tibial pulses are 2+ on the right side and 2+ on the left side. Heart sounds: Normal heart sounds. No murmur heard. No friction rub. No gallop. Comments: No Edema Lower Extremities  Pulmonary:      Effort: Pulmonary effort is normal.      Breath sounds: Normal breath sounds. No wheezing or rales. Abdominal:      General: Bowel sounds are normal. There is no distension. Palpations: Abdomen is soft. There is no hepatomegaly or splenomegaly. Tenderness: There is no abdominal tenderness. There is no guarding or rebound.    Musculoskeletal:         General: No tenderness or deformity. Normal range of motion. Cervical back: Normal range of motion and neck supple. Lymphadenopathy:      Cervical: No cervical adenopathy. Skin:     General: Skin is warm and dry. Findings: No erythema or rash. Neurological:      Mental Status: He is alert. Cranial Nerves: No cranial nerve deficit. Sensory: No sensory deficit. Gait: Gait normal.   Psychiatric:         Speech: Speech normal.         Behavior: Behavior normal.           Assessment   Plan   1. Well adult exam  Appears well:  Counselled diet, development, anticipatory guidance and safety issues with patient and or parent(s). 2. Essential hypertension  Controlled: Appears stable. We will continue current management and monitor for adverse reaction and disease progression. Follow-up as noted below    - Lipid Panel; Future  - Comprehensive Metabolic Panel; Future    3. Sciatica of right side  Appears improved patient does not endorse any symptoms of sciatica today    4. NAFLD (nonalcoholic fatty liver disease)  Counseled on lower processed carbohydrates in his diet. We will check labs today  - Comprehensive Metabolic Panel; Future    5. Encounter for well adult exam without abnormal findings  As above    6.  Screening for diabetes mellitus (DM)  Renal  - Hemoglobin A1C - Lab Draw; Future      Personalized Preventive Plan   Current Health Maintenance Status  Immunization History   Administered Date(s) Administered    COVID-19, Pfizer Purple top, DILUTE for use, 12+ yrs, 30mcg/0.3mL dose 03/19/2021, 04/08/2021, 10/19/2021    Influenza A ([id number]) Vaccine PF IM 12/19/2009    Influenza Virus Vaccine 10/09/2010, 09/23/2014, 10/08/2015, 09/26/2016, 10/04/2017, 10/04/2018, 09/17/2020, 09/15/2021    Influenza Whole 09/12/2013    Influenza, MDCK Quadv, IM, PF (Flucelvax 2 yrs and older) 09/20/2017    Influenza, Quadv, IM, PF (6 mo and older Fluzone, Flulaval, Fluarix, and 3 yrs and older Afluria) 09/26/2016,

## 2022-01-24 NOTE — TELEPHONE ENCOUNTER
Patient is due for his diagnostic colonoscopy 7/2022 with Dr. Erin Banda. Please give him a call to schedule.

## 2022-01-25 DIAGNOSIS — K76.0 NAFLD (NONALCOHOLIC FATTY LIVER DISEASE): ICD-10-CM

## 2022-01-25 DIAGNOSIS — I10 ESSENTIAL HYPERTENSION: ICD-10-CM

## 2022-01-25 DIAGNOSIS — Z13.1 SCREENING FOR DIABETES MELLITUS (DM): ICD-10-CM

## 2022-01-25 LAB
A/G RATIO: 2.2 (ref 1.1–2.2)
ALBUMIN SERPL-MCNC: 4.3 G/DL (ref 3.4–5)
ALP BLD-CCNC: 86 U/L (ref 40–129)
ALT SERPL-CCNC: 89 U/L (ref 10–40)
ANION GAP SERPL CALCULATED.3IONS-SCNC: 14 MMOL/L (ref 3–16)
AST SERPL-CCNC: 33 U/L (ref 15–37)
BILIRUB SERPL-MCNC: 0.5 MG/DL (ref 0–1)
BUN BLDV-MCNC: 11 MG/DL (ref 7–20)
CALCIUM SERPL-MCNC: 9 MG/DL (ref 8.3–10.6)
CHLORIDE BLD-SCNC: 99 MMOL/L (ref 99–110)
CHOLESTEROL, TOTAL: 182 MG/DL (ref 0–199)
CO2: 27 MMOL/L (ref 21–32)
CREAT SERPL-MCNC: 0.8 MG/DL (ref 0.9–1.3)
ESTIMATED AVERAGE GLUCOSE: 116.9 MG/DL
GFR AFRICAN AMERICAN: >60
GFR NON-AFRICAN AMERICAN: >60
GLUCOSE BLD-MCNC: 122 MG/DL (ref 70–99)
HBA1C MFR BLD: 5.7 %
HDLC SERPL-MCNC: 54 MG/DL (ref 40–60)
LDL CHOLESTEROL CALCULATED: 70 MG/DL
POTASSIUM SERPL-SCNC: 4 MMOL/L (ref 3.5–5.1)
SODIUM BLD-SCNC: 140 MMOL/L (ref 136–145)
TOTAL PROTEIN: 6.3 G/DL (ref 6.4–8.2)
TRIGL SERPL-MCNC: 291 MG/DL (ref 0–150)
VLDLC SERPL CALC-MCNC: 58 MG/DL

## 2022-01-26 NOTE — TELEPHONE ENCOUNTER
Patient has been scheduled for:    Procedure: Colonoscopy   Date: 7/6/22  Time: 8:00AM  Arrival: 6:30AM  [facility]  ASA?: Aspirin 7 days   Prep? Colon, reviewed by phone and emailed    Pre-op? N/A    Post-op Appt? N/A     Patient advised they will need a . Orders routed to surgery scheduling. Instructions have been emailed to:  [email or web address]

## 2022-01-28 RX ORDER — POLYETHYLENE GLYCOL 3350, SODIUM CHLORIDE, SODIUM BICARBONATE, POTASSIUM CHLORIDE 420; 11.2; 5.72; 1.48 G/4L; G/4L; G/4L; G/4L
POWDER, FOR SOLUTION ORAL
Qty: 1 EACH | Refills: 0 | Status: SHIPPED | OUTPATIENT
Start: 2022-01-28

## 2022-02-07 ENCOUNTER — PATIENT MESSAGE (OUTPATIENT)
Dept: PRIMARY CARE CLINIC | Age: 52
End: 2022-02-07

## 2022-02-07 DIAGNOSIS — G47.00 INSOMNIA, UNSPECIFIED TYPE: ICD-10-CM

## 2022-02-07 RX ORDER — ESZOPICLONE 2 MG/1
2 TABLET, FILM COATED ORAL NIGHTLY
Qty: 30 TABLET | Refills: 0 | Status: SHIPPED | OUTPATIENT
Start: 2022-02-07 | End: 2022-03-30 | Stop reason: SDUPTHER

## 2022-02-07 NOTE — TELEPHONE ENCOUNTER
Medication:   Requested Prescriptions     Pending Prescriptions Disp Refills    eszopiclone (LUNESTA) 2 MG TABS 30 tablet 0     Sig: Take 1 tablet by mouth nightly. Last Filled:  12/21/21  Patient Phone Number: [phone] (home) [phone] (work)    Last appt: 1/24/2022   Next appt: 6/3/2022    Last OARRS:   RX Monitoring 1/17/2018   Attestation The Prescription Monitoring Report for this patient was reviewed today.

## 2022-02-07 NOTE — TELEPHONE ENCOUNTER
From: Maldonado Gupta  To: Dr. Judy Abdalla: 2/7/2022 9:17 AM EST  Subject: Refill needed    Hello Dr. Carlos A Raygoza,  I need a refill on my Eszopiclone. Could you please call it into [address] [place] [address] in [place] on [place]? Thank you.  Esa Rosales

## 2022-02-21 ENCOUNTER — OFFICE VISIT (OUTPATIENT)
Dept: PRIMARY CARE CLINIC | Age: 52
End: 2022-02-21
Payer: COMMERCIAL

## 2022-02-21 VITALS
SYSTOLIC BLOOD PRESSURE: 137 MMHG | HEART RATE: 81 BPM | BODY MASS INDEX: 35.71 KG/M2 | HEIGHT: 70 IN | WEIGHT: 249.4 LBS | DIASTOLIC BLOOD PRESSURE: 85 MMHG | TEMPERATURE: 97 F

## 2022-02-21 DIAGNOSIS — L50.9 URTICARIA: Primary | ICD-10-CM

## 2022-02-21 DIAGNOSIS — L20.82 FLEXURAL ECZEMA: ICD-10-CM

## 2022-02-21 DIAGNOSIS — J30.1 NON-SEASONAL ALLERGIC RHINITIS DUE TO POLLEN: ICD-10-CM

## 2022-02-21 DIAGNOSIS — M54.31 SCIATICA OF RIGHT SIDE: ICD-10-CM

## 2022-02-21 PROCEDURE — 99214 OFFICE O/P EST MOD 30 MIN: CPT | Performed by: STUDENT IN AN ORGANIZED HEALTH CARE EDUCATION/TRAINING PROGRAM

## 2022-02-21 RX ORDER — PREDNISONE 20 MG/1
40 TABLET ORAL DAILY
Qty: 20 TABLET | Refills: 0 | Status: SHIPPED | OUTPATIENT
Start: 2022-02-21 | End: 2022-03-03

## 2022-02-21 ASSESSMENT — ENCOUNTER SYMPTOMS
BACK PAIN: 0
SHORTNESS OF BREATH: 0
WHEEZING: 0

## 2022-02-21 NOTE — PATIENT INSTRUCTIONS
Patient Education        Hives: Care Instructions  Your Care Instructions  Hives are raised, red, itchy patches of skin. They are also called wheals or welts. They usually have red borders and pale centers. Hives range in size from ¼ inch to 3 inches or more across. They may seem to move from place to place on the skin. Several hives may form a large area of raised, red skin. You can get hives after an insect sting, after taking medicine or eating certain foods, or because of infection or stress. Other causes include plants, things you breathe in, makeup, heat, cold, sunlight, and latex. You cannot spread hives to other people. Hives may last a few minutes or a few days, but a single spot may last less than 36 hours. Follow-up care is a key part of your treatment and safety. Be sure to make and go to all appointments, and call your doctor if you are having problems. It's also a good idea to know your test results and keep a list of the medicines you take. How can you care for yourself at home? · Avoid whatever you think may have caused your hives, such as a certain food or medicine. However, you may not know the cause. · Put a cool, wet towel on the area to relieve itching. · Take an over-the-counter antihistamine, such as diphenhydramine (Benadryl), cetirizine (Zyrtec), or loratadine (Claritin), to help stop the hives and calm the itching. Read and follow directions on the label. These medicines can make you feel sleepy. Do not drive while using them. · Stay away from strong soaps, detergents, and chemicals. These can make itching worse. When should you call for help? Call 911 anytime you think you may need emergency care. For example, call if:    · You have symptoms of a severe allergic reaction. These may include:  ? Sudden raised, red areas (hives) all over your body. ? Swelling of the throat, mouth, lips, or tongue. ? Trouble breathing. ? Passing out (losing consciousness).  Or you may feel very lightheaded or suddenly feel weak, confused, or restless. Call your doctor now or seek immediate medical care if:    · You have symptoms of an allergic reaction, such as:  ? A rash or hives (raised, red areas on the skin). ? Itching. ? Swelling. ? Belly pain, nausea, or vomiting.     · You get hives after you start a new medicine.     · Hives have not gone away after 24 hours. Watch closely for changes in your health, and be sure to contact your doctor if:    · You do not get better as expected. Where can you learn more? Go to [email or web address] and sign in to your [facility] account. Enter C160 in the [employer] box to learn more about \"Hives: Care Instructions. \"     If you do not have an account, please click on the \"Sign Up Now\" link. Current as of: July 1, 2021               Content Version: 13.1  © 2006-2021 Healthwise, Incorporated. Care instructions adapted under license by [facility] ([facility]). If you have questions about a medical condition or this instruction, always ask your healthcare professional. [place] [address] any warranty or liability for your use of this information.

## 2022-02-21 NOTE — PROGRESS NOTES
[facility]  2022    Zack Richmond (:  1970) is a 45 y.o. male, here for evaluation of the following medical concerns:    Chief Complaint   Patient presents with    Other     rash on arms and feet, itchy has been there a few weeks, small red dots         ASSESSMENT/ PLAN  1. Urticaria  Uncontrolled, likely secondary to seasonal change with underlying skin hypersensitivity. Continue application of hydrocortisone/Polysporin, prednisone burst prescribed, restart home Allegra-D and follow-up with dermatologist as scheduled. No new irritants/triggers identified. - predniSONE (DELTASONE) 20 MG tablet; Take 2 tablets by mouth daily for 10 days  Dispense: 20 tablet; Refill: 0    2. Non-seasonal allergic rhinitis due to pollen  Restart Allegra-D    3. Flexural eczema  Continue hydrocortisone, Polysporin application    4. Sciatica of right side  Uncontrolled, likely secondary to previous CVA. May have a component of primary sciatica as well. Patient declines pharmacotherapy at this time. He states that he has a physical therapist who he will contact to follow-up       Return if symptoms worsen or fail to improve, for follow up with Dr. Merced Valentino . HPI  Patient presents today with concerns of rash, sciatica. He notes that he has a history of eczema, and is currently transitioning between dermatologists. He was recommended to mix Polysporin and hydrocortisone for his skin lesions, and he has been doing this. However, he notes that he has itchy, diffuse lesions on his upper and lower extremity bilaterally. He denies new detergents, soaps. He is very diligent about sticking to organic, nonfragranced soaps and lotions. No new environmental exposures. He states that this problem has been going on for 4 weeks. He also notes that since his stroke, he has experienced right upper and lower extremity paresthesias.   He also endorses a burning pain down the backside of his right leg which radiates into his foot. He previously saw pain medicine, who prescribed him gabapentin. He would like to stay away from additional medications at this time. No weakness of his lower extremity. He denies back pain. ROS  Review of Systems   Constitutional: Negative for activity change and unexpected weight change. Respiratory: Negative for shortness of breath and wheezing. Cardiovascular: Negative for palpitations and leg swelling. Musculoskeletal: Negative for back pain, gait problem and myalgias. Skin: Positive for rash. HISTORIES  Current Outpatient Medications on File Prior to Visit   Medication Sig Dispense Refill    eszopiclone (LUNESTA) 2 MG TABS Take 1 tablet by mouth nightly. 30 tablet 0    amLODIPine-benazepril (LOTREL) 10-20 MG per capsule Take 1 capsule by mouth daily 90 capsule 3    atorvastatin (LIPITOR) 40 MG tablet Take 0.5 tablets by mouth daily 45 tablet 3    hydroCHLOROthiazide (HYDRODIURIL) 25 MG tablet Take 1 tablet by mouth daily 90 tablet 2    vitamin D 25 MCG (1000 UT) CAPS Take 1,000 Units by mouth daily      Barberry-Oreg Grape-Goldenseal (BERBERINE COMPLEX) 200-200-50 MG CAPS Take by mouth daily      aspirin 81 MG tablet Take 81 mg by mouth daily      polyethylene glycol-electrolytes (NULYTELY) 420 g solution TAKE AS DIRECTED DAY PRIOR TO COLONOSCOPY (Patient not taking: Reported on 2/21/2022) 1 each 0     No current facility-administered medications on file prior to visit.       Past Medical History:   Diagnosis Date    Allergic rhinitis due to pollen 01/20/2016    Basal cell carcinoma of left side of nose 08/19/2019    Cerebrovascular accident ([facility] [address] 07/16/2015    s/p TPA - residual numbness on R,    Cervical radiculopathy 05/23/2014    Chicken pox     Degenerative joint disease of knee 12/12/2013    Hyperlipidemia     Hypertension 12/13/2018    NAFLD (nonalcoholic fatty liver disease) 03/08/2019    RITESH on CPAP     Sleep Management [place]    Osteoarthritis of cervical spine 01/20/2016    Plantar fasciitis of right foot 10/19/2015    Primary osteoarthritis of both knees 01/20/2016    Right sided sciatica      Patient Active Problem List   Diagnosis    Elevated liver enzymes    Acute meniscal tear of knee    Degenerative joint disease of knee    Chondromalacia of patella    Sprain and strain of medial collateral ligament of knee    Tear of medial cartilage or meniscus of knee, current    Cervical radiculopathy    Numbness and tingling of right side of face    Arterial ischemic stroke (HCC)    Plantar fasciitis of right foot    Right sided sciatica    Paresthesia of right arm and leg    Allergic rhinitis due to pollen    Primary osteoarthritis of both knees    Osteoarthritis of cervical spine    Ischemic stroke ([facility] [address]    Primary osteoarthritis of right knee    Mixed hyperlipidemia    Non-seasonal allergic rhinitis due to pollen    Elevated ferritin    Colonic mass    Polyp of ascending colon    Normocytic anemia    Encounter for colonoscopy due to history of adenomatous colonic polyps    Basal cell carcinoma of left side of nose    Spitting suture    H/O right hemicolectomy    Hypertension    NAFLD (nonalcoholic fatty liver disease)    Vitamin D deficiency    Obesity (BMI 30.0-34. 9)    History of left tennis elbow       PE  Vitals:    02/21/22 0802   BP: 137/85   Site: Left Upper Arm   Position: Sitting   Cuff Size: Large Adult   Pulse: 81   Temp: 97 °F (36.1 °C)   TempSrc: Temporal   Weight: 249 lb 6.4 oz (113.1 kg)   Height: 5' 10\" (1.778 m)     Estimated body mass index is 35.79 kg/m² as calculated from the following:    Height as of this encounter: 5' 10\" (1.778 m). Weight as of this encounter: 249 lb 6.4 oz (113.1 kg). Physical Exam  Vitals reviewed. Constitutional:       General: He is not in acute distress. Appearance: Normal appearance. HENT:      Head: Normocephalic and atraumatic.    Eyes: Extraocular Movements: Extraocular movements intact. Pupils: Pupils are equal, round, and reactive to light. Cardiovascular:      Rate and Rhythm: Normal rate and regular rhythm. Pulses: Normal pulses. Heart sounds: Normal heart sounds. No murmur heard. No gallop. Pulmonary:      Effort: Pulmonary effort is normal.      Breath sounds: Normal breath sounds. No wheezing or rales. Musculoskeletal:      Right lower leg: No edema. Left lower leg: No edema. Skin:     General: Skin is warm and dry. Comments: Dry, erythematous hives present on bilateral upper and lower extremity   Neurological:      Mental Status: He is alert. Miguel Keys DO    This dictation was generated by voice recognition computer software. Although all attempts are made to edit the dictation for accuracy, there may be errors in the transcription that are not intended.

## 2022-07-05 ENCOUNTER — TELEPHONE (OUTPATIENT)
Dept: SURGERY | Age: 52
End: 2022-07-05

## 2022-07-05 ENCOUNTER — ANESTHESIA EVENT (OUTPATIENT)
Dept: ENDOSCOPY | Age: 52
End: 2022-07-05
Payer: COMMERCIAL

## 2022-07-05 NOTE — TELEPHONE ENCOUNTER
Confirmed arrival time of 6:30am with a . Patient understands clear liquid diet and golytly instructions.

## 2022-07-06 ENCOUNTER — HOSPITAL ENCOUNTER (OUTPATIENT)
Age: 52
Setting detail: OUTPATIENT SURGERY
Discharge: HOME | End: 2022-07-06
Attending: SURGERY | Admitting: SURGERY
Payer: COMMERCIAL

## 2022-07-06 ENCOUNTER — ANESTHESIA (OUTPATIENT)
Dept: ENDOSCOPY | Age: 52
End: 2022-07-06
Payer: COMMERCIAL

## 2022-07-06 VITALS
RESPIRATION RATE: 18 BRPM | SYSTOLIC BLOOD PRESSURE: 130 MMHG | DIASTOLIC BLOOD PRESSURE: 75 MMHG | TEMPERATURE: 97.6 F | HEART RATE: 60 BPM | HEIGHT: 70 IN | OXYGEN SATURATION: 95 % | WEIGHT: 248 LBS | BODY MASS INDEX: 35.5 KG/M2

## 2022-07-06 PROCEDURE — 2580000003 HC RX 258: Performed by: ANESTHESIOLOGY

## 2022-07-06 PROCEDURE — 7100000011 HC PHASE II RECOVERY - ADDTL 15 MIN: Performed by: SURGERY

## 2022-07-06 PROCEDURE — 2500000003 HC RX 250 WO HCPCS: Performed by: NURSE ANESTHETIST, CERTIFIED REGISTERED

## 2022-07-06 PROCEDURE — 2709999900 HC NON-CHARGEABLE SUPPLY: Performed by: SURGERY

## 2022-07-06 PROCEDURE — 6360000002 HC RX W HCPCS: Performed by: NURSE ANESTHETIST, CERTIFIED REGISTERED

## 2022-07-06 PROCEDURE — 3700000001 HC ADD 15 MINUTES (ANESTHESIA): Performed by: SURGERY

## 2022-07-06 PROCEDURE — 7100000010 HC PHASE II RECOVERY - FIRST 15 MIN: Performed by: SURGERY

## 2022-07-06 PROCEDURE — 3609027000 HC COLONOSCOPY: Performed by: SURGERY

## 2022-07-06 PROCEDURE — 3700000000 HC ANESTHESIA ATTENDED CARE: Performed by: SURGERY

## 2022-07-06 PROCEDURE — 45378 DIAGNOSTIC COLONOSCOPY: CPT | Performed by: SURGERY

## 2022-07-06 RX ORDER — SODIUM CHLORIDE 0.9 % (FLUSH) 0.9 %
5-40 SYRINGE (ML) INJECTION EVERY 12 HOURS SCHEDULED
Status: CANCELLED | OUTPATIENT
Start: 2022-07-06

## 2022-07-06 RX ORDER — TRAZODONE HYDROCHLORIDE 50 MG/1
50 TABLET ORAL NIGHTLY
COMMUNITY

## 2022-07-06 RX ORDER — LIDOCAINE HYDROCHLORIDE 20 MG/ML
INJECTION, SOLUTION EPIDURAL; INFILTRATION; INTRACAUDAL; PERINEURAL PRN
Status: DISCONTINUED | OUTPATIENT
Start: 2022-07-06 | End: 2022-07-06 | Stop reason: SDUPTHER

## 2022-07-06 RX ORDER — SODIUM CHLORIDE 9 MG/ML
INJECTION, SOLUTION INTRAVENOUS PRN
Status: DISCONTINUED | OUTPATIENT
Start: 2022-07-06 | End: 2022-07-06 | Stop reason: HOSPADM

## 2022-07-06 RX ORDER — SODIUM CHLORIDE 0.9 % (FLUSH) 0.9 %
5-40 SYRINGE (ML) INJECTION PRN
Status: CANCELLED | OUTPATIENT
Start: 2022-07-06

## 2022-07-06 RX ORDER — SODIUM CHLORIDE 9 MG/ML
INJECTION, SOLUTION INTRAVENOUS PRN
Status: CANCELLED | OUTPATIENT
Start: 2022-07-06

## 2022-07-06 RX ORDER — SODIUM CHLORIDE, SODIUM LACTATE, POTASSIUM CHLORIDE, CALCIUM CHLORIDE 600; 310; 30; 20 MG/100ML; MG/100ML; MG/100ML; MG/100ML
INJECTION, SOLUTION INTRAVENOUS CONTINUOUS
Status: DISCONTINUED | OUTPATIENT
Start: 2022-07-06 | End: 2022-07-06 | Stop reason: HOSPADM

## 2022-07-06 RX ORDER — ONDANSETRON 2 MG/ML
4 INJECTION INTRAMUSCULAR; INTRAVENOUS
Status: CANCELLED | OUTPATIENT
Start: 2022-07-06 | End: 2022-07-06

## 2022-07-06 RX ORDER — PROPOFOL 10 MG/ML
INJECTION, EMULSION INTRAVENOUS PRN
Status: DISCONTINUED | OUTPATIENT
Start: 2022-07-06 | End: 2022-07-06 | Stop reason: SDUPTHER

## 2022-07-06 RX ORDER — SODIUM CHLORIDE 0.9 % (FLUSH) 0.9 %
5-40 SYRINGE (ML) INJECTION EVERY 12 HOURS SCHEDULED
Status: DISCONTINUED | OUTPATIENT
Start: 2022-07-06 | End: 2022-07-06 | Stop reason: HOSPADM

## 2022-07-06 RX ORDER — SODIUM CHLORIDE 0.9 % (FLUSH) 0.9 %
5-40 SYRINGE (ML) INJECTION PRN
Status: DISCONTINUED | OUTPATIENT
Start: 2022-07-06 | End: 2022-07-06 | Stop reason: HOSPADM

## 2022-07-06 RX ORDER — PROPOFOL 10 MG/ML
INJECTION, EMULSION INTRAVENOUS CONTINUOUS PRN
Status: DISCONTINUED | OUTPATIENT
Start: 2022-07-06 | End: 2022-07-06 | Stop reason: SDUPTHER

## 2022-07-06 RX ADMIN — PROPOFOL 100 MG: 10 INJECTION, EMULSION INTRAVENOUS at 08:06

## 2022-07-06 RX ADMIN — SODIUM CHLORIDE, POTASSIUM CHLORIDE, SODIUM LACTATE AND CALCIUM CHLORIDE: 600; 310; 30; 20 INJECTION, SOLUTION INTRAVENOUS at 08:06

## 2022-07-06 RX ADMIN — LIDOCAINE HYDROCHLORIDE 100 MG: 20 INJECTION, SOLUTION EPIDURAL; INFILTRATION; INTRACAUDAL; PERINEURAL at 08:06

## 2022-07-06 RX ADMIN — SODIUM CHLORIDE, POTASSIUM CHLORIDE, SODIUM LACTATE AND CALCIUM CHLORIDE: 600; 310; 30; 20 INJECTION, SOLUTION INTRAVENOUS at 07:09

## 2022-07-06 RX ADMIN — PROPOFOL 150 MCG/KG/MIN: 10 INJECTION, EMULSION INTRAVENOUS at 08:06

## 2022-07-06 ASSESSMENT — PAIN - FUNCTIONAL ASSESSMENT
PAIN_FUNCTIONAL_ASSESSMENT: 0-10
PAIN_FUNCTIONAL_ASSESSMENT: 0-10
PAIN_FUNCTIONAL_ASSESSMENT: NONE - DENIES PAIN
PAIN_FUNCTIONAL_ASSESSMENT: NONE - DENIES PAIN

## 2022-07-06 NOTE — ANESTHESIA PRE PROCEDURE
Department of Anesthesiology  Preprocedure Note       Name:  Geoffrey Gore   Age:  45 y.o.  :  1970                                          MRN:  [id number]         Date:  2022      Surgeon: Yuko Query):  Deepa Tomlinson MD    Procedure: COLONOSCOPY POSSIBLE POLYPECTOMY (N/A )    Medications prior to admission:   Prior to Admission medications    Medication Sig Start Date End Date Taking? Authorizing Provider   traZODone (DESYREL) 50 MG tablet Take 50 mg by mouth nightly    Historical Provider, MD   polyethylene glycol-electrolytes (NULYTELY) 420 g solution TAKE AS DIRECTED DAY PRIOR TO COLONOSCOPY  Patient not taking: Reported on 2022   Deepa Tomlinson MD   amLODIPine-benazepril (LOTREL) 10-20 MG per capsule Take 1 capsule by mouth daily 21   Tara Valerio MD   atorvastatin (LIPITOR) 40 MG tablet Take 0.5 tablets by mouth daily 21   Tara Valerio MD   hydroCHLOROthiazide (HYDRODIURIL) 25 MG tablet Take 1 tablet by mouth daily 21   Tara Valerio MD   vitamin D 25 MCG (1000 UT) CAPS Take 1,000 Units by mouth daily    Historical Provider, MD   Barberry-Oreg Grape-Goldenseal (BERBERINE COMPLEX) 200-200-50 MG CAPS Take by mouth daily    Historical Provider, MD   aspirin 81 MG tablet Take 81 mg by mouth daily    Historical Provider, MD       Current medications:    No current facility-administered medications for this visit. No current outpatient medications on file.      Facility-Administered Medications Ordered in Other Visits   Medication Dose Route Frequency Provider Last Rate Last Admin    lactated ringers infusion   IntraVENous Continuous Chico Judy,  mL/hr at 22 0709 New Bag at 22 0709    sodium chloride flush 0.9 % injection 5-40 mL  5-40 mL IntraVENous 2 times per day Chico Judy, DO        sodium chloride flush 0.9 % injection 5-40 mL  5-40 mL IntraVENous PRN Chico Judy, DO        0.9 % sodium chloride infusion IntraVENous PRN Lyndon Punch, DO           Allergies:  No Known Allergies    Problem List:    Patient Active Problem List   Diagnosis Code    Elevated liver enzymes R74.8    Acute meniscal tear of knee S83.209A    Degenerative joint disease of knee M17.10    Chondromalacia of patella M22.40    Sprain and strain of medial collateral ligament of knee S83.419A    Tear of medial cartilage or meniscus of knee, current [id number]    Cervical radiculopathy M54.12    Numbness and tingling of right side of face R20.0, R20.2    Arterial ischemic stroke ([address] I63.9    Plantar fasciitis of right foot M72.2    Right sided sciatica M54.31    Paresthesia of right arm and leg R20.2    Allergic rhinitis due to pollen J30.1    Primary osteoarthritis of both knees M17.0    Osteoarthritis of cervical spine M47.812    Ischemic stroke ([facility] I63.9    Primary osteoarthritis of right knee M17.11    Mixed hyperlipidemia E78.2    Non-seasonal allergic rhinitis due to pollen J30.1    Elevated ferritin R79.89    Colonic mass K63.89    Polyp of ascending colon K63.5    Normocytic anemia D64.9    Encounter for colonoscopy due to history of adenomatous colonic polyps Z12.11, Z86.010    Basal cell carcinoma of left side of nose C44.311    Spitting suture T81.30XA    H/O right hemicolectomy Z90.49    Hypertension I10    NAFLD (nonalcoholic fatty liver disease) K76.0    Vitamin D deficiency E55.9    Obesity (BMI 30.0-34. 9) E66.9    History of left tennis elbow Z87.39       Past Medical History:        Diagnosis Date    Allergic rhinitis due to pollen 01/20/2016    Basal cell carcinoma of left side of nose 08/19/2019    Cerebrovascular accident ([facility] 07/16/2015    s/p TPA - residual numbness on R,    Cervical radiculopathy 05/23/2014    Chicken pox     Degenerative joint disease of knee 12/12/2013    Hyperlipidemia     Hypertension 12/13/2018    NAFLD (nonalcoholic fatty liver disease) 03/08/2019    RITESH on CPAP     [facility] [place]    Osteoarthritis of cervical spine 01/20/2016    Plantar fasciitis of right foot 10/19/2015    Primary osteoarthritis of both knees 01/20/2016    Right sided sciatica        Past Surgical History:        Procedure Laterality Date    CERVICAL FUSION  2012    [address]- Dr. Lopez Guess  05/31/2018     LAPAROSCOPIC, CONVERTED TO OPEN RIGHT COLECTOMY    COLONOSCOPY N/A 07/03/2019    COLONOSCOPY, POSSIBLE POLYPECTOMY WITH MAC performed by Blake Penaloza MD at [address] [place] ARTHROSCOPY Right 12/26/2013    MOHS SURGERY  08/19/2019    L nasal tip    TOTAL KNEE ARTHROPLASTY Right 2019    Dr. Patrick Fisher       Social History:    Social History     Tobacco Use    Smoking status: Former Smoker     Packs/day: 0.01     Start date: 12/19/2012    Smokeless tobacco: Former User     Quit date: 8/29/2012   Substance Use Topics    Alcohol use: Yes     Alcohol/week: 14.0 - 21.0 standard drinks     Types: 14 - 21 Glasses of wine per week     Comment: 2-3 glasses of wine everynight,                                 Counseling given: Not Answered      Vital Signs (Current): There were no vitals filed for this visit.                                            BP Readings from Last 3 Encounters:   07/06/22 132/89   02/21/22 137/85   01/24/22 126/72       NPO Status:                                                                                 BMI:   Wt Readings from Last 3 Encounters:   07/06/22 248 lb (112.5 kg)   02/21/22 249 lb 6.4 oz (113.1 kg)   01/24/22 247 lb 3.2 oz (112.1 kg)     There is no height or weight on file to calculate BMI.    CBC:   Lab Results   Component Value Date/Time    WBC 6.4 11/23/2018 07:24 AM    RBC 4.78 11/23/2018 07:24 AM    HGB 14.8 11/23/2018 07:24 AM    HCT 43.4 11/23/2018 07:24 AM    MCV 90.7 11/23/2018 07:24 AM    RDW 12.8 11/23/2018 07:24 AM     11/23/2018 07:24 AM       CMP:   Lab Results   Component Value Date/Time     01/25/2022 07:36 AM    K 4.0 01/25/2022 07:36 AM    CL 99 01/25/2022 07:36 AM    CO2 27 01/25/2022 07:36 AM    BUN 11 01/25/2022 07:36 AM    CREATININE 0.8 01/25/2022 07:36 AM    GFRAA >60 01/25/2022 07:36 AM    GFRAA >60 03/23/2013 08:02 AM    AGRATIO 2.2 01/25/2022 07:36 AM    LABGLOM >60 01/25/2022 07:36 AM    GLUCOSE 122 01/25/2022 07:36 AM    PROT 6.3 01/25/2022 07:36 AM    PROT 6.4 12/27/2012 09:52 AM    CALCIUM 9.0 01/25/2022 07:36 AM    BILITOT 0.5 01/25/2022 07:36 AM    ALKPHOS 86 01/25/2022 07:36 AM    AST 33 01/25/2022 07:36 AM    ALT 89 01/25/2022 07:36 AM       POC Tests: No results for input(s): POCGLU, POCNA, POCK, POCCL, POCBUN, POCHEMO, POCHCT in the last 72 hours. Coags:   Lab Results   Component Value Date/Time    PROTIME 12.5 05/31/2018 11:24 PM    INR 1.10 05/31/2018 11:24 PM    APTT 30.1 07/15/2015 07:23 PM       HCG (If Applicable): No results found for: PREGTESTUR, PREGSERUM, HCG, HCGQUANT     ABGs: No results found for: PHART, PO2ART, TIL9BGG, LDE3OQB, BEART, V8BNULSB     Type & Screen (If Applicable):  No results found for: LABABO, [address]    Anesthesia Evaluation  Patient summary reviewed  Airway: Mallampati: IV  TM distance: >3 FB   Neck ROM: full  Mouth opening: > = 3 FB   Dental: normal exam         Pulmonary:normal exam  breath sounds clear to auscultation  (+) sleep apnea: on CPAP,                             Cardiovascular:  Exercise tolerance: good (>4 METS),   (+) hypertension:, hyperlipidemia        Rhythm: regular  Rate: normal                    Neuro/Psych:   (+) CVA: residual symptoms, neuromuscular disease:,             GI/Hepatic/Renal:   (+) liver disease:, bowel prep,           Endo/Other: Negative Endo/Other ROS                    Abdominal:       Abdomen: soft. Vascular: negative vascular ROS. Other Findings:             Anesthesia Plan      MAC     ASA 3       Induction: intravenous.       Anesthetic plan and risks discussed with patient. Plan discussed with CRNA.     Attending anesthesiologist reviewed and agrees with Preprocedure content                Vanda Harvey DO   7/6/2022

## 2022-07-06 NOTE — ANESTHESIA POSTPROCEDURE EVALUATION
Department of Anesthesiology  Postprocedure Note    Patient: Grace Truong  MRN: [id number]  YOB: 1970  Date of evaluation: 7/6/2022      Procedure Summary     Date: 07/06/22 Room / Location: [facility]    Anesthesia Start: 0801 Anesthesia Stop: 0825    Procedure: COLONOSCOPY POSSIBLE POLYPECTOMY (N/A ) Diagnosis:       Screening for colon cancer      (Screening for colon cancer [Z12.11])    Surgeons: Mohan Coulter MD Responsible Provider: Indy Lynn DO    Anesthesia Type: MAC ASA Status: 3          Anesthesia Type: No value filed. Gibran Phase I: Gibran Score: 10    Gibran Phase II: Gibran Score: 10      Anesthesia Post Evaluation    Patient location during evaluation: PACU  Patient participation: complete - patient participated  Level of consciousness: awake  Pain score: 0  Airway patency: patent  Nausea & Vomiting: no nausea and no vomiting  Complications: no  Cardiovascular status: hemodynamically stable  Respiratory status: acceptable  Hydration status: euvolemic  There was medical reason for not using a multimodal analgesia pain management approach.

## 2022-07-06 NOTE — H&P
PRE-ENDOSCOPY H&P    Visit Date: 7/6/2022    History:     Papito Valdes is a 45 y.o. male who presents today for endoscopy procedure. See A/P below for indications. Patient Active Problem List   Diagnosis    Elevated liver enzymes    Acute meniscal tear of knee    Degenerative joint disease of knee    Chondromalacia of patella    Sprain and strain of medial collateral ligament of knee    Tear of medial cartilage or meniscus of knee, current    Cervical radiculopathy    Numbness and tingling of right side of face    Arterial ischemic stroke ([address]    Plantar fasciitis of right foot    Right sided sciatica    Paresthesia of right arm and leg    Allergic rhinitis due to pollen    Primary osteoarthritis of both knees    Osteoarthritis of cervical spine    Ischemic stroke ([address]    Primary osteoarthritis of right knee    Mixed hyperlipidemia    Non-seasonal allergic rhinitis due to pollen    Elevated ferritin    Colonic mass    Polyp of ascending colon    Normocytic anemia    Encounter for colonoscopy due to history of adenomatous colonic polyps    Basal cell carcinoma of left side of nose    Spitting suture    H/O right hemicolectomy    Hypertension    NAFLD (nonalcoholic fatty liver disease)    Vitamin D deficiency    Obesity (BMI 30.0-34. 9)    History of left tennis elbow     Scheduled Meds:   sodium chloride flush  5-40 mL IntraVENous 2 times per day     Continuous Infusions:   lactated ringers 125 mL/hr at 07/06/22 0801    sodium chloride       PRN Meds:.sodium chloride flush, sodium chloride  Prior to Admission medications    Medication Sig Start Date End Date Taking?  Authorizing Provider   traZODone (DESYREL) 50 MG tablet Take 50 mg by mouth nightly   Yes Historical Provider, MD   polyethylene glycol-electrolytes (NULYTELY) 420 g solution TAKE AS DIRECTED DAY PRIOR TO COLONOSCOPY  Patient not taking: Reported on 2/21/2022 1/28/22   Bijal Sommer MD   amLODIPine-benazepril (LOTREL) 10-20 MG per capsule Take 1 capsule by mouth daily 12/22/21   Dominique Cid MD   atorvastatin (LIPITOR) 40 MG tablet Take 0.5 tablets by mouth daily 12/22/21   Dominique Cid MD   hydroCHLOROthiazide (HYDRODIURIL) 25 MG tablet Take 1 tablet by mouth daily 9/20/21   Dominique Cid MD   vitamin D 25 MCG (1000 UT) CAPS Take 1,000 Units by mouth daily    Historical Provider, MD   Barberry-Oreg Grape-Goldenseal (BERBERINE COMPLEX) 659252-32 MG CAPS Take by mouth daily    Historical Provider, MD   aspirin 81 MG tablet Take 81 mg by mouth daily    Historical Provider, MD     No Known Allergies  Past Medical History:   Diagnosis Date    Allergic rhinitis due to pollen 01/20/2016    Basal cell carcinoma of left side of nose 08/19/2019    Cerebrovascular [address] 07/16/2015    s/p TPA - residual numbness on R,    Cervical radiculopathy 05/23/2014    Chicken pox     Degenerative joint disease of knee 12/12/2013    Hyperlipidemia     Hypertension 12/13/2018    NAFLD (nonalcoholic fatty liver disease) 03/08/2019    RITESH on CPAP     Sleep [facility] [place]    Osteoarthritis of cervical spine 01/20/2016    Plantar fasciitis of right foot 10/19/2015    Primary osteoarthritis of both knees 01/20/2016    Right sided sciatica      Past Surgical History:   Procedure Laterality Date    CERVICAL FUSION  2012    [address]- Dr. Blas Koenig  05/31/2018     LAPAROSCOPIC, CONVERTED TO OPEN RIGHT COLECTOMY    COLONOSCOPY N/A 07/03/2019    COLONOSCOPY, POSSIBLE POLYPECTOMY WITH MAC performed by Mohan Coulter MD at [address] [place] [address] ARTHROSCOPY Right 12/26/2013    [facility]  [facility]    L nasal tip    TOTAL KNEE ARTHROPLASTY Right 2019    Dr. Reagan Cordoba       Physical Exam:     /89   Pulse 71   Temp 98.2 °F (36.8 °C) (Temporal)   Resp 18   Ht 5' 10\" (1.778 m)   Wt 248 lb (112.5 kg)   SpO2 98%   BMI 35.58 kg/m²  Body mass index is 35.58

## 2022-11-05 DIAGNOSIS — E78.2 MIXED HYPERLIPIDEMIA: ICD-10-CM

## 2022-11-07 RX ORDER — ATORVASTATIN CALCIUM 40 MG/1
TABLET, FILM COATED ORAL
Qty: 45 TABLET | Refills: 3 | Status: SHIPPED | OUTPATIENT
Start: 2022-11-07

## 2022-11-07 NOTE — TELEPHONE ENCOUNTER
Medication:   Requested Prescriptions     Pending Prescriptions Disp Refills    atorvastatin (LIPITOR) 40 MG tablet [Pharmacy Med Name: ATORVASTATIN 40 MG TABLET] 45 tablet 3     Sig: TAKE 1/2 TABLET BY MOUTH DAILY       Last Filled:      Patient Phone Number: [phone] (home) [phone] (work)    Last appt: 2/21/2022   Next appt: Visit date not found  Return if symptoms worsen or fail to improve, for follow up with Dr. Carlos Kee  Last PSA:   Lab Results   Component Value Date/Time    PSA 0.55 01/17/2018 07:12 AM

## 2023-01-28 DIAGNOSIS — I10 ESSENTIAL HYPERTENSION: ICD-10-CM

## 2023-01-30 RX ORDER — AMLODIPINE BESYLATE AND BENAZEPRIL HYDROCHLORIDE 10; 20 MG/1; MG/1
CAPSULE ORAL
Qty: 90 CAPSULE | Refills: 3 | OUTPATIENT
Start: 2023-01-30

## 2023-01-30 NOTE — TELEPHONE ENCOUNTER
Medication:   Requested Prescriptions     Pending Prescriptions Disp Refills    amLODIPine-benazepril (LOTREL) 10-20 MG per capsule [Pharmacy Med Name: amLODIPine-BENAZEPRIL 10-20 MG CAP] 90 capsule 3     Sig: TAKE ONE CAPSULE BY MOUTH DAILY        Last Filled:  12/22/21    Patient Phone Number: [phone] (home) [phone] (work)    Last appt: 2/21/2022   Next appt: Visit date not found    Last OARRS:   RX Monitoring 1/17/2018   Attestation The Prescription Monitoring Report for this patient was reviewed today.

## 2023-11-15 ENCOUNTER — PROCEDURE VISIT (OUTPATIENT)
Dept: SURGERY | Age: 53
End: 2023-11-15
Payer: COMMERCIAL

## 2023-11-15 VITALS — DIASTOLIC BLOOD PRESSURE: 93 MMHG | SYSTOLIC BLOOD PRESSURE: 142 MMHG | HEART RATE: 85 BPM

## 2023-11-15 DIAGNOSIS — C44.01 BASAL CELL CARCINOMA (BCC) OF SKIN OF LEFT UPPER LIP: Primary | ICD-10-CM

## 2023-11-15 PROCEDURE — 12052 INTMD RPR FACE/MM 2.6-5.0 CM: CPT | Performed by: DERMATOLOGY

## 2023-11-15 PROCEDURE — 17311 MOHS 1 STAGE H/N/HF/G: CPT | Performed by: DERMATOLOGY

## 2023-11-15 RX ORDER — NIFEDIPINE 30 MG/1
30 TABLET, FILM COATED, EXTENDED RELEASE ORAL DAILY
COMMUNITY
Start: 2023-05-22

## 2023-11-15 NOTE — PROGRESS NOTES
PRE-PROCEDURE SCREENING    Pacemaker/ICD: No  Difficulty with numbing in the past: No  Local Anesthesia Reaction/passing out: No  Latex or adhesive allergy:  Yes, Skin can get red, prefers paper tape  Bleeding/Clotting Disorders: No  Anticoagulant Therapy: Yes, ASA   Joint prosthesis: Yes, R knee  Artificial Heart Valve: No  Stroke or Seizures: Yes, Stroke 2015  Organ Transplant or Lymphoma: No  Immunosuppression: No  Respiratory Problems: No
was carefully defined and debulked, determining the edge of the surgical excision. A thin layer of tumor-laden tissue was excised with a narrow margin of normal-appearing skin, using the technique of Mohs. A map was prepared to correspond to the area of skin from which it was excised. Hemostasis was achieved using electrosurgery. The wound was bandaged. The tissue was prepared for the cryostat and sectioned. 1 section(s) prepared. Each section was coded, cut, and stained for microscopic examination. The entire base and margins of the excised piece of tissue were examined by the surgeon. The tissue was examined to the level of subcutaneous fat. No tumor was identified at the peripheral margins of stage I of microscopically controlled surgery. DEFECT MANAGEMENT:    REPAIR DESCRIPTION:  Various closure modalities were discussed with the patient, and it was decided that an intermediate layered repair would best preserve normal anatomic and functional relationships. Additional risk of wound dehiscence was discussed. The area was anesthetized with 0.5% lidocaine with epinephrine 1:200,000 buffered, was given a sterile prep using Chlorhexidine gluconate 4% solution and draped in the usual sterile fashion. Recreation and enlargement of the wound was performed by excising cones of tissue via the triangulation technique. The final incision lines were placed with respect for the patient's natural skin tension lines in a linear configuration to avoid functional and aesthetic distortion of adjacent free margins. Following minimal undermining, meticulous hemostasis was obtained with spot monopolar electrocoagulation. Subcutaneous dead space and dermis were closed using 5-0 Vicryl buried subcutaneous interrupted suture and the epidermis was approximated with   liquid skin adhesive.          WOUND COVERAGE:  The wound was cleaned with normal saline solution, dried off, Aquaphor ointment was applied,

## 2023-11-16 ENCOUNTER — TELEPHONE (OUTPATIENT)
Dept: SURGERY | Age: 53
End: 2023-11-16

## 2023-11-16 NOTE — TELEPHONE ENCOUNTER
The patient was in the office on 11/15/2023 for Mohs located on the LT upper cutaneous lip with ILC repair. The patient tolerated the procedure well and left the office in good condition. A post-operative telephone call was placed at 9:25a, 11/16/2023, in order to check on the patient's recovery process. The patient was not able to be reached and a phone message was left. L/M to call back w/any questions/concerns.

## 2024-09-07 ENCOUNTER — APPOINTMENT (OUTPATIENT)
Dept: GENERAL RADIOLOGY | Age: 54
End: 2024-09-07
Payer: COMMERCIAL

## 2024-09-07 ENCOUNTER — HOSPITAL ENCOUNTER (EMERGENCY)
Age: 54
Discharge: HOME | End: 2024-09-07
Attending: STUDENT IN AN ORGANIZED HEALTH CARE EDUCATION/TRAINING PROGRAM
Payer: COMMERCIAL

## 2024-09-07 VITALS
OXYGEN SATURATION: 97 % | RESPIRATION RATE: 18 BRPM | DIASTOLIC BLOOD PRESSURE: 89 MMHG | BODY MASS INDEX: 36.18 KG/M2 | HEIGHT: 70 IN | WEIGHT: 252.7 LBS | HEART RATE: 88 BPM | TEMPERATURE: 98.6 F | SYSTOLIC BLOOD PRESSURE: 130 MMHG

## 2024-09-07 DIAGNOSIS — M25.562 ACUTE PAIN OF LEFT KNEE: Primary | ICD-10-CM

## 2024-09-07 PROCEDURE — 73562 X-RAY EXAM OF KNEE 3: CPT

## 2024-09-07 PROCEDURE — 99283 EMERGENCY DEPT VISIT LOW MDM: CPT

## 2024-09-07 PROCEDURE — 73564 X-RAY EXAM KNEE 4 OR MORE: CPT

## 2024-09-07 ASSESSMENT — ENCOUNTER SYMPTOMS
ABDOMINAL PAIN: 0
SHORTNESS OF BREATH: 0
TROUBLE SWALLOWING: 0
VOMITING: 0
EYE REDNESS: 0
NAUSEA: 0
RHINORRHEA: 0
DIARRHEA: 0
COUGH: 0
EYE PAIN: 0
BLOOD IN STOOL: 0
SORE THROAT: 0
BACK PAIN: 0

## 2024-09-07 ASSESSMENT — LIFESTYLE VARIABLES
HOW OFTEN DO YOU HAVE A DRINK CONTAINING ALCOHOL: NEVER
HOW MANY STANDARD DRINKS CONTAINING ALCOHOL DO YOU HAVE ON A TYPICAL DAY: PATIENT DOES NOT DRINK

## 2024-09-07 NOTE — DISCHARGE INSTRUCTIONS
Apply a compressive ACE bandage. Rest and elevate the affected painful area.  Apply cold compresses intermittently as needed.  As pain recedes, begin normal activities slowly as tolerated.  Call if symptoms persist.      Please follow-up with orthopedic surgery, use crutches, and stay off of your knee until you are able to see orthopedic surgery.

## 2024-09-07 NOTE — ED PROVIDER NOTES
THE [facility]  EMERGENCY DEPARTMENT ENCOUNTER          ATTENDING PHYSICIAN NOTE       Date of evaluation: 9/7/2024    Chief Complaint     Knee Pain (Presents w/ L knee injust after falling over a stack of wood backwards. Pt states he felt it pop-denies pain numbness, or tingling down the rest of the leg to the foot.)      History of Present Illness     Micheal Glaser is a 54 y.o. male who presents to the emergency department pain in his knee.  Patient reports that he was cutting down with a chainsaw when he had sudden onset popping sensation in his left knee.  He has not had any intervention his knee in the past.  Patient use crutches that he had leftover from when he had a knee replacement done on his right knee.  Reports that he has had no other abnormalities but does reports that he has some instability on the knee.    ASSESSMENT / PLAN  (MEDICAL DECISION MAKING)     INITIAL VITALS: BP: (!) 146/86, Temp: 98.6 °F (37 °C), Pulse: 88, Respirations: 18, SpO2: 97 %      Micheal Glaser is a 54 y.o. male presenting emergency department with left knee instability and pain after a popping sensation.  He is neuro vastly intact in the extremity.  Had no fall and there is no skin defects.  He has a little laxity laterally on my examination.  Plain film obtained that shows no acute fracture.  Patient does have an orthopedic surgeon will follow-up with them in the next few days.  RICE therapy recommended.  Patient does not show signs of any dislocation or other acute abnormality.  Return precautions discussed.    Is this patient to be included in the SEP-1 core measure? No Exclusion criteria - the patient is NOT to be included for SEP-1 Core Measure due to: Infection is not suspected    Medical Decision Making  Amount and/or Complexity of Data Reviewed  Radiology: ordered.          Clinical Impression     1. Acute pain of left knee        Disposition     PATIENT REFERRED TO:  No follow-up provider       Findings: No rash.   Neurological:      General: No focal deficit present.      Mental Status: He is alert and oriented to person, place, and time. Mental status is at baseline.      Cranial Nerves: No cranial nerve deficit.      Sensory: No sensory deficit.      Motor: No weakness.   Psychiatric:         Mood and Affect: Mood normal.         Behavior: Behavior normal.         Thought Content: Thought content normal.                     Samantha Davila MD  09/07/24 [id number]

## 2025-08-21 ENCOUNTER — TELEPHONE (OUTPATIENT)
Dept: SURGERY | Age: 55
End: 2025-08-21

## (undated) DEVICE — CANNULA SAMP CO2 AD GRN 7FT CO2 AND 7FT O2 TBNG UNIV CONN